# Patient Record
Sex: FEMALE | Race: WHITE | NOT HISPANIC OR LATINO | Employment: OTHER | URBAN - METROPOLITAN AREA
[De-identification: names, ages, dates, MRNs, and addresses within clinical notes are randomized per-mention and may not be internally consistent; named-entity substitution may affect disease eponyms.]

---

## 2021-02-01 ENCOUNTER — PREP FOR SURGERY (OUTPATIENT)
Dept: OTHER | Facility: HOSPITAL | Age: 81
End: 2021-02-01

## 2021-02-01 ENCOUNTER — OFFICE VISIT (OUTPATIENT)
Dept: NEUROSURGERY | Facility: CLINIC | Age: 81
End: 2021-02-01

## 2021-02-01 ENCOUNTER — TELEPHONE (OUTPATIENT)
Dept: NEUROSURGERY | Facility: CLINIC | Age: 81
End: 2021-02-01

## 2021-02-01 VITALS — BODY MASS INDEX: 28.16 KG/M2 | WEIGHT: 153 LBS | HEIGHT: 62 IN

## 2021-02-01 DIAGNOSIS — M25.559 PAIN IN JOINT INVOLVING PELVIC REGION AND THIGH, UNSPECIFIED LATERALITY: Primary | ICD-10-CM

## 2021-02-01 DIAGNOSIS — M54.50 LUMBAGO: Primary | ICD-10-CM

## 2021-02-01 PROBLEM — M43.16 SPONDYLOLISTHESIS AT L4-L5 LEVEL: Status: ACTIVE | Noted: 2021-02-01

## 2021-02-01 PROCEDURE — 99204 OFFICE O/P NEW MOD 45 MIN: CPT | Performed by: NEUROLOGICAL SURGERY

## 2021-02-01 RX ORDER — HYDROCODONE BITARTRATE AND ACETAMINOPHEN 7.5; 325 MG/1; MG/1
1 TABLET ORAL ONCE
Status: CANCELLED | OUTPATIENT
Start: 2021-02-01 | End: 2021-02-01

## 2021-02-01 RX ORDER — INSULIN GLARGINE 100 [IU]/ML
INJECTION, SOLUTION SUBCUTANEOUS DAILY
COMMUNITY
Start: 2020-10-25

## 2021-02-01 RX ORDER — DIPHENOXYLATE HYDROCHLORIDE AND ATROPINE SULFATE 2.5; .025 MG/1; MG/1
1 TABLET ORAL DAILY
COMMUNITY

## 2021-02-01 RX ORDER — ACETAMINOPHEN 325 MG/1
650 TABLET ORAL ONCE
Status: CANCELLED | OUTPATIENT
Start: 2021-02-01 | End: 2021-02-01

## 2021-02-01 RX ORDER — FAMOTIDINE 20 MG/1
20 TABLET, FILM COATED ORAL
Status: CANCELLED | OUTPATIENT
Start: 2021-02-01

## 2021-02-01 RX ORDER — HYDROCHLOROTHIAZIDE 12.5 MG/1
12.5 CAPSULE, GELATIN COATED ORAL DAILY
COMMUNITY

## 2021-02-01 RX ORDER — OMEPRAZOLE 20 MG/1
20 CAPSULE, DELAYED RELEASE ORAL DAILY
COMMUNITY
Start: 2020-12-14

## 2021-02-01 RX ORDER — TRAMADOL HYDROCHLORIDE 50 MG/1
TABLET ORAL
COMMUNITY

## 2021-02-01 RX ORDER — PROMETHAZINE HYDROCHLORIDE 25 MG/1
TABLET ORAL AS NEEDED
COMMUNITY
Start: 2021-01-29

## 2021-02-01 RX ORDER — IBUPROFEN 800 MG/1
800 TABLET ORAL ONCE
Status: CANCELLED | OUTPATIENT
Start: 2021-02-01 | End: 2021-02-01

## 2021-02-01 RX ORDER — FUROSEMIDE 20 MG/1
20 TABLET ORAL DAILY
COMMUNITY
Start: 2020-10-25

## 2021-02-01 RX ORDER — DIPHENHYDRAMINE HCL 25 MG
25 CAPSULE ORAL NIGHTLY PRN
COMMUNITY

## 2021-02-01 RX ORDER — CHLORHEXIDINE GLUCONATE 4 G/100ML
SOLUTION TOPICAL
Qty: 120 ML | Refills: 0 | Status: ON HOLD | OUTPATIENT
Start: 2021-02-01 | End: 2021-03-17

## 2021-02-01 RX ORDER — SODIUM CHLORIDE, SODIUM LACTATE, POTASSIUM CHLORIDE, CALCIUM CHLORIDE 600; 310; 30; 20 MG/100ML; MG/100ML; MG/100ML; MG/100ML
9 INJECTION, SOLUTION INTRAVENOUS CONTINUOUS
Status: CANCELLED | OUTPATIENT
Start: 2021-02-01

## 2021-02-01 RX ORDER — PREGABALIN 75 MG/1
75 CAPSULE ORAL ONCE
Status: CANCELLED | OUTPATIENT
Start: 2021-02-01 | End: 2021-02-01

## 2021-02-01 RX ORDER — LISINOPRIL 40 MG/1
40 TABLET ORAL 2 TIMES DAILY
COMMUNITY
Start: 2020-12-24

## 2021-02-01 RX ORDER — ALPRAZOLAM 0.5 MG/1
0.5 TABLET ORAL 2 TIMES DAILY PRN
COMMUNITY

## 2021-02-01 RX ORDER — CEFAZOLIN SODIUM 2 G/100ML
2 INJECTION, SOLUTION INTRAVENOUS ONCE
Status: CANCELLED | OUTPATIENT
Start: 2021-02-01 | End: 2021-02-01

## 2021-02-01 NOTE — TELEPHONE ENCOUNTER
Dr. Cordero's OV note is not complete - unclear if she is going to have surgery. If so, he may be willing to write something stronger such as Tramadol but that will need to be discussed with him. Please let her know we will get back to her tomorrow. We typically do not write for pain medication unless in post op period.

## 2021-02-01 NOTE — PROGRESS NOTES
NAME: FLAQUITO MARTINEZ   DOS: 2021  : 1940  PCP: Christian Mendoza MD    Chief Complaint:    Chief Complaint   Patient presents with   • Back Pain     Low back pain, that extends to lower extremeties.        History of Present Illness:  80 y.o. female       PMHX  Allergies:  Allergies   Allergen Reactions   • Amoxicillin-Pot Clavulanate Unknown - Low Severity   • Chlorzoxazone Unknown - High Severity   • Sulfa Antibiotics Unknown - High Severity     Medications    Current Outpatient Medications:   •  ALPRAZolam (XANAX) 0.5 MG tablet, alprazolam 0.5 mg tablet  Every night at bedtime, Disp: , Rfl:   •  Aspirin Buf,CaCarb-MgCarb-MgO, 81 MG tablet, aspirin 81 mg tablet  Daily, Disp: , Rfl:   •  diphenhydrAMINE (Benadryl Allergy) 25 mg capsule, Benadryl 25 mg capsule  prn, Disp: , Rfl:   •  furosemide (LASIX) 20 MG tablet, Daily., Disp: , Rfl:   •  hydroCHLOROthiazide (MICROZIDE) 12.5 MG capsule, Daily., Disp: , Rfl:   •  Lantus SoloStar 100 UNIT/ML injection pen, , Disp: , Rfl:   •  lisinopril (PRINIVIL,ZESTRIL) 40 MG tablet, 2 (two) times a day., Disp: , Rfl:   •  multivitamin (MULTI VITAMIN PO), Daily., Disp: , Rfl:   •  omeprazole (priLOSEC) 20 MG capsule, , Disp: , Rfl:   •  promethazine (PHENERGAN) 25 MG tablet, As Needed., Disp: , Rfl:   •  traMADol (ULTRAM) 50 MG tablet, tramadol 50 mg tablet  1 q Every twelve hours, Disp: , Rfl:   Past Medical History:  Past Medical History:   Diagnosis Date   • Arthritis    • Back problem    • Bladder infection    • Bleeding tendency (CMS/HCC)    • Bronchitis    • Diabetes mellitus (CMS/HCC)    • Direct hernia    • Gout    • Hearing loss    • Heart disease     stint placement   • Hepatitis    • History of stomach ulcers    • History of transfusion    • Hypertension      Past Surgical History:  Past Surgical History:   Procedure Laterality Date   • BREAST SURGERY     • CAROTID STENT     • DILATATION AND CURETTAGE     • OTHER SURGICAL HISTORY       Social  Hx:  Social History     Tobacco Use   • Smoking status: Never Smoker   • Smokeless tobacco: Never Used   Substance Use Topics   • Alcohol use: Never     Frequency: Never   • Drug use: Never     Family Hx:  Family History   Problem Relation Age of Onset   • Hypertension Mother    • Stroke Mother    • Heart disease Father    • Diabetes Father    • Stroke Father      Review of Systems:        Review of Systems   Constitutional: Positive for activity change and appetite change. Negative for chills, diaphoresis, fatigue, fever and unexpected weight change.   HENT: Positive for drooling. Negative for congestion, dental problem, ear discharge, ear pain, facial swelling, hearing loss, mouth sores, nosebleeds, postnasal drip, rhinorrhea, sinus pressure, sinus pain, sneezing, sore throat, tinnitus, trouble swallowing and voice change.    Eyes: Positive for visual disturbance. Negative for photophobia, pain, discharge, redness and itching.   Respiratory: Positive for shortness of breath. Negative for apnea, cough, choking, chest tightness, wheezing and stridor.    Cardiovascular: Positive for leg swelling. Negative for chest pain and palpitations.   Gastrointestinal: Negative for abdominal distention, abdominal pain, anal bleeding, blood in stool, constipation, diarrhea, nausea, rectal pain and vomiting.   Endocrine: Negative for cold intolerance, heat intolerance, polydipsia, polyphagia and polyuria.   Genitourinary: Positive for decreased urine volume and difficulty urinating. Negative for dyspareunia, dysuria, enuresis, flank pain, frequency, genital sores, hematuria, menstrual problem, pelvic pain, urgency, vaginal bleeding, vaginal discharge and vaginal pain.   Musculoskeletal: Positive for back pain, gait problem and joint swelling. Negative for arthralgias, myalgias, neck pain and neck stiffness.   Skin: Negative for color change, pallor, rash and wound.   Allergic/Immunologic: Negative for environmental allergies, food  allergies and immunocompromised state.   Neurological: Positive for numbness. Negative for dizziness, tremors, seizures, syncope, facial asymmetry, speech difficulty, weakness, light-headedness and headaches.   Hematological: Negative for adenopathy. Does not bruise/bleed easily.   Psychiatric/Behavioral: Positive for sleep disturbance. Negative for agitation, behavioral problems, confusion, decreased concentration, dysphoric mood, hallucinations, self-injury and suicidal ideas. The patient is nervous/anxious. The patient is not hyperactive.             Physical Examination:  There were no vitals filed for this visit.   General Appearance:   Well developed, well nourished, well groomed, alert, and cooperative.  Neurological examination:  Neurologic Exam      Review of Imaging/DATA:    Diagnoses/Plan:    Ms. Ogden is a 80 y.o. female ***

## 2021-02-01 NOTE — TELEPHONE ENCOUNTER
Provider:  Gil  Caller: Patient  Time of call:     Phone #:  [phone]  Surgery:  Lumbar Laminectomy, posterior lumbar fusion  Surgery Date: TBD    Last visit:  2/1/2021   Next visit: TBD        Reason for call:  Patient was seen in the office today by Dr. Cordero. She left a VM after her appointment, stating that she would like to know what she can take to help with the pain. Patient states that the tylenol and ibuprofen do not help with the pain.

## 2021-02-01 NOTE — PROGRESS NOTES
NAME: FLAQUITO MARTINEZ   DOS: 2021  : 1940  PCP: Christian Mendoza MD    Chief Complaint:    Chief Complaint   Patient presents with   • Back Pain     Low back pain, that extends to lower extremeties.        History of Present Illness:  80 y.o. female   I saw this very pleasant 80-year-old female in neurosurgical consultation she is a complex history of anxiety and depressive issues and recently has had chronic back pain issues for at least 2 years it precipitated with abrupt decline in function associated with use of a walker she has right-sided sciatic leg pain with a mild amount of foot weakness but she denies strictly any symptoms of cauda equina syndrome she is been to the emergency room twice.  She has been to the emergency room twice and has her daughter here with her she is intolerant to medications had a history of surgical complications from eye surgery and is very reticent to undergo any interventional therapy she has a chronic history of anxiety      PMHX  Allergies:  Allergies   Allergen Reactions   • Amoxicillin-Pot Clavulanate Unknown - Low Severity   • Chlorzoxazone Unknown - High Severity   • Sulfa Antibiotics Unknown - High Severity     Medications    Current Outpatient Medications:   •  ALPRAZolam (XANAX) 0.5 MG tablet, alprazolam 0.5 mg tablet  Every night at bedtime, Disp: , Rfl:   •  Aspirin Buf,CaCarb-MgCarb-MgO, 81 MG tablet, aspirin 81 mg tablet  Daily, Disp: , Rfl:   •  diphenhydrAMINE (Benadryl Allergy) 25 mg capsule, Benadryl 25 mg capsule  prn, Disp: , Rfl:   •  furosemide (LASIX) 20 MG tablet, Daily., Disp: , Rfl:   •  hydroCHLOROthiazide (MICROZIDE) 12.5 MG capsule, Daily., Disp: , Rfl:   •  Lantus SoloStar 100 UNIT/ML injection pen, , Disp: , Rfl:   •  lisinopril (PRINIVIL,ZESTRIL) 40 MG tablet, 2 (two) times a day., Disp: , Rfl:   •  multivitamin (MULTI VITAMIN PO), Daily., Disp: , Rfl:   •  omeprazole (priLOSEC) 20 MG capsule, , Disp: , Rfl:   •  promethazine  (PHENERGAN) 25 MG tablet, As Needed., Disp: , Rfl:   •  traMADol (ULTRAM) 50 MG tablet, tramadol 50 mg tablet  1 q Every twelve hours, Disp: , Rfl:   Past Medical History:  Past Medical History:   Diagnosis Date   • Arthritis    • Back problem    • Bladder infection    • Bleeding tendency (CMS/HCC)    • Bronchitis    • Diabetes mellitus (CMS/HCC)    • Direct hernia    • Gout    • Hearing loss    • Heart disease     stint placement   • Hepatitis    • History of stomach ulcers    • History of transfusion    • Hypertension      Past Surgical History:  Past Surgical History:   Procedure Laterality Date   • BREAST SURGERY     • CAROTID STENT     • DILATATION AND CURETTAGE  1967   • OTHER SURGICAL HISTORY       Social Hx:  Social History     Tobacco Use   • Smoking status: Never Smoker   • Smokeless tobacco: Never Used   Substance Use Topics   • Alcohol use: Never     Frequency: Never   • Drug use: Never     Family Hx:  Family History   Problem Relation Age of Onset   • Hypertension Mother    • Stroke Mother    • Heart disease Father    • Diabetes Father    • Stroke Father      Review of Systems:        Review of Systems negative for cauda equina syndrome fevers chills        Physical Examination:  Reviewed and in the chart  General Appearance:   Well developed, well nourished, well groomed, alert, and cooperative.  Neurological examination:  Neurologic Exam  Vital signs were reviewed and documented in the chart  Patient appeared in good neurologic function with normal comprehension fluent speech  Mood and affect are normal  Sense of smell deferred    Pupils symmetric equally reactive funduscopic exam not visualized   Visual fields intact to confrontation  Extraocular movements intact  Face motor function is symmetric  Facial sensations normal  Hearing intact to finger rub hearing intact to finger rub  Tongue is midline  Palate symmetric  Swallowing normal  Shoulder shrug normal    Muscle bulk and tone normal  5 out of 5  strength no motor drift, weakness right foot noted  Walks with a walker  Negative Romberg  No clonus long tract signs or myelopathy    Reflexes symmetric  No edema noted and extremities skin appears normal    Straight leg raise sign absent on the left positive on the right  No signs of intrinsic hip dysfunction  Back is without any lesions or abnormality  Feet are warm and well perfused    Review of Imaging/DATA:  MRI shows massive central disc herniation L4-5 with spondylolisthesis massive central disc herniation and critical central spinal canal stenosis.   Diagnoses/Plan:    Ms. Ogden is a 80 y.o. female   I  spent 45 minutes with her and her daughter I explained the complicated nature of the complex nature of her issue she is admitted advanced age very reticent to undergo any surgical procedures but has a massive central disc herniation with critical central spinal stenosis and I explained to her the nature of this she does not want surgery but I think she requires a I explained the risk benefits expected outcome from surgery I explained that I think that she has a very good prognosis but I did explain that there could be potential bumps in the road and she is understanding    I spent 45 minutes with her face-to-face     Addendum (2/10/2021): Insurance company is holding up patient surgery.  Patient surgery is scheduled for next week but is not willing to agree to a lumbar fusion when this is not absolutely necessary in this patient's case.  Patient has a massive central disc herniation with bilateral facet disease and a spondylolisthesis.  Patient has critical canal stenosis ( impending cauda equina syndrome) and this needs bilateral facetectomies for adequate decompression.  I told my staff that I am willing to do a personal peer to peer to get this approved for this poor lady.

## 2021-02-01 NOTE — PROGRESS NOTES
NAME: FLAQUITO MARTINEZ   DOS: 2021  : 1940  PCP: Christian Mendoza MD    Chief Complaint:    Spinal pain weakness    History of Present Illness:   I saw this very pleasant 80-year-old female in neurosurgical consultation she is a complex history of anxiety and depressive issues and recently has had chronic back pain issues for at least 2 years it precipitated with abrupt decline in function associated with use of a walker she has right-sided sciatic leg pain with a mild amount of foot weakness but she denies strictly any symptoms of cauda equina syndrome she is been to the emergency room twice      PMHX  Allergies:  Not on File  Medications  No current outpatient medications on file.  Past Medical History:  No past medical history on file.  Past Surgical History:  No past surgical history on file.  Social Hx:  Social History     Tobacco Use   • Smoking status: Not on file   Substance Use Topics   • Alcohol use: Not on file   • Drug use: Not on file     Family Hx:  No family history on file.  Review of Systems:        Review of Systems         Physical Examination:  There were no vitals filed for this visit.   General Appearance:   Well developed, well nourished, well groomed, alert, and cooperative.  Neurological examination:  Neurologic Exam      Review of Imaging/DATA:    Diagnoses/Plan:    Ms. Martinez is a 80 y.o. female ***

## 2021-02-02 NOTE — TELEPHONE ENCOUNTER
I called the patient to advise. Patient states that she is not taking the Norco and was just wondering if their was any other medication she can take. Then patient stated that it is okay if their isn't anything.     Patient had a question about the sx, that Cordero mentioned during her last visit, she would like to know if it will be invasive or laparoscopic?     Patient states that she is going to see her cardiologist for the clearance for the sx.

## 2021-02-02 NOTE — TELEPHONE ENCOUNTER
KIMBERLEE hernandez... she had pain pills yesterday. She should ask that provider to continue prescribing until surgery. She had norco yesterday.

## 2021-02-05 ENCOUNTER — TELEPHONE (OUTPATIENT)
Dept: NEUROSURGERY | Facility: CLINIC | Age: 81
End: 2021-02-05

## 2021-02-05 PROBLEM — M54.50 LUMBAGO: Status: ACTIVE | Noted: 2021-02-05

## 2021-02-05 NOTE — TELEPHONE ENCOUNTER
Caller: FLAQUITOJOHNY BERRIOSMARTINEZ    Relationship to patient: SELF    Best call back number: [phone]    Chief complaint: THE PATIENT NEEDS TO HAVE A STRESS TEST WITH HER CARDIOLOGIST BEFORE SCHEDULING SURGERY. THE EARLIEST THE STRESS TEST COULD BE SCHEDULED FOR WAS 2/15/21.     Type of visit: SURGERY    Requested date: ASAP    If rescheduling, when is the original appointment: N/A    Additional notes: THE PATIENT WAS CONCERNED IF SHE WILL BE ABLE TO HAVE HER SURGERY BEFORE DR. MORALES LEAVES FOR VACATION. SHE WILL BE CONTACTING HER CARDIOLOGIST ABOUT POSSIBLY SCHEDULING THE STRESS TEST SOONER.

## 2021-02-08 ENCOUNTER — TELEPHONE (OUTPATIENT)
Dept: NEUROSURGERY | Facility: CLINIC | Age: 81
End: 2021-02-08

## 2021-02-08 NOTE — TELEPHONE ENCOUNTER
Provider:  Gil  Caller: Linnea  Time of call:   11:15a  Phone #:  [phone]  Surgery:  LUMBAR LAMINECTOMY POSTERIOR LUMBAR INTERBODY FUSION 4/5  Surgery Date:  2/17/21  Last visit:   2/1/21  Next visit: --        Reason for call:         Patient is hesitant to proceed with stress test and the surgery because of the visitor policy. Doesn't like that no one can stay the night or come back and sit with her. She is requesting that an exception be made for her  or she doesn't think she can make it.     I advised her of the visitor hours and policies that are in place for all patient/staff safety.     Please advise.

## 2021-02-09 NOTE — TELEPHONE ENCOUNTER
Discussed with david... patients daughter may stay in room overnight. Patients  is at high risk... we haven't done this since the pandemic, so I am unsure of how to get this done... but policy for PRE op/ PAT and PACU will remain the same

## 2021-02-12 ENCOUNTER — TELEPHONE (OUTPATIENT)
Dept: NEUROSURGERY | Facility: CLINIC | Age: 81
End: 2021-02-12

## 2021-02-12 NOTE — TELEPHONE ENCOUNTER
Provider:  Gil  Caller: Patient  Time of call:     Phone #:    Surgery:    Surgery Date:    Last visit:     Next visit:     RENATE:         Reason for call: Patient states that she is having sx on Wednesday and she would like to know what will happen regarding the weather conditions. Patient states she understands that Dr. Cordero is leaving on the 22nd and she would like to get something worked out.

## 2021-02-12 NOTE — TELEPHONE ENCOUNTER
Called patient with an update.  I explained the risk of infection while being on steroids for an extended period prior to surgery.  She does not want to risk this, therefore we discussed how to wean prior to surgery.  She is going to consider this.     Additionally, patient informed me that she is concerned with her health issues that she would do well in surgery.  I explained to Dr. Cordero would not recommend intervention if he did not feel that it would not be of optimal benefit.  She is getting a stress test on Monday at that point she will be cardiac cleared.  Patient is very anxious to proceed.  She is going to consider things over the weekend.  I will call her Monday to discuss further.  She is aware that her surgery may be postponed due to impending weather, insurance and cardiac approval.

## 2021-02-12 NOTE — TELEPHONE ENCOUNTER
Spoke with patient. She is scheduled for stress test on Monday for cardiac clearance.  She is concerned with the weather.  I explained that additionally we do not know the status of the weather next week.  Per Dr. Cordero's documentation we are also awaiting insurance approval for her surgery.  I am going check the status of this today and let her know.  We may have to push her surgery out for insurance and weather reasons.    Additionally, patient informed me that she did not tell Dr. Cordero that she has been taking prednisone 10 mg for 3 months.  Plan to discuss this with him today.    I will call the patient back this afternoon with more information.

## 2021-02-15 ENCOUNTER — APPOINTMENT (OUTPATIENT)
Dept: PREADMISSION TESTING | Facility: HOSPITAL | Age: 81
End: 2021-02-15

## 2021-02-15 ENCOUNTER — TELEPHONE (OUTPATIENT)
Dept: NEUROSURGERY | Facility: CLINIC | Age: 81
End: 2021-02-15

## 2021-02-15 DIAGNOSIS — M43.16 SPONDYLOLISTHESIS AT L4-L5 LEVEL: Primary | ICD-10-CM

## 2021-02-15 NOTE — TELEPHONE ENCOUNTER
Spoke with the patient. Told her I'll reschedule her surgery once insurance approves it and cleared by cardiology.

## 2021-02-15 NOTE — TELEPHONE ENCOUNTER
Mrs. Roberson called stating that due to the weather for today and the rest of the week, she wouldn't be making it in for her stress test, blood work, and covid test for her surgery that is scheduled on 2/17. Also stated that because she doesn't know if her insurance would cover her surgery, she would like to reschedule at a later date.

## 2021-02-16 ENCOUNTER — TELEPHONE (OUTPATIENT)
Dept: PAIN MEDICINE | Facility: CLINIC | Age: 81
End: 2021-02-16

## 2021-02-16 ENCOUNTER — APPOINTMENT (OUTPATIENT)
Dept: PREADMISSION TESTING | Facility: HOSPITAL | Age: 81
End: 2021-02-16

## 2021-02-16 NOTE — TELEPHONE ENCOUNTER
CALLED PT AND SET UP APPT. 03/16 AT 1 AND TRIED TO WARM TRANSFER AS THE PT HAD SOME QUESTIONS, AND THE OFFICE WAS CLOSED 02/16. PT WANTS A CALL BACK REGARDING THE APPOINTMENT AND ALSO STATED THAT IF A APPOINTMENT BECAME AVAILABLE FOR THE TIMES 10 AND 3 ON A EARLIER DAY SHE WOULD LIKE A CALL.. OFFICE PLEASE ADVISE           BEST CALL BACK NUMBER:[phone]

## 2021-02-17 ENCOUNTER — TELEPHONE (OUTPATIENT)
Dept: NEUROSURGERY | Facility: CLINIC | Age: 81
End: 2021-02-17

## 2021-02-17 NOTE — TELEPHONE ENCOUNTER
Patient returned call with many questions. Patient stated her current situation, reporting that her insurance would not pay for her sx.     Patient would like to know if Dr. Cordero only does sx or if sx is the only option that is going to help her? I told the patient that I do not have the credentials to offer medical advice.     Does Dr. You work in our office? I told the patient yes.     Is there a pain management [place] that [place] to? I told the pt I was unsure.     I offered to give her the contact information to pain management. She states that she would like to talk to a PA.

## 2021-02-17 NOTE — TELEPHONE ENCOUNTER
Patient has a great deal of anxiety regarding her surgery. I have explained the urgency given her severe spinal stenosis and concern for progression to cauda equina syndrome. She is understanding.  We are still awaiting approval from insurance. She is agreeable to proceed if once approved.

## 2021-02-17 NOTE — TELEPHONE ENCOUNTER
Called patient to discuss all her questions and concerns. She is looking forward to trying our route instead of surgery.

## 2021-02-17 NOTE — TELEPHONE ENCOUNTER
Provider:  Gil  Caller: Patient  Time of call:   2:06  Phone #:  [phone]  Surgery:  NA  Surgery Date: NA   Last visit:   2/1/2021  Next visit: NA         Reason for call:  Patient left a VM stating that she had questions about her referral to pain management but she did not ask the questions in the VM.

## 2021-02-19 ENCOUNTER — TELEPHONE (OUTPATIENT)
Dept: NEUROSURGERY | Facility: CLINIC | Age: 81
End: 2021-02-19

## 2021-02-23 ENCOUNTER — TELEPHONE (OUTPATIENT)
Dept: NEUROSURGERY | Facility: CLINIC | Age: 81
End: 2021-02-23

## 2021-02-23 NOTE — TELEPHONE ENCOUNTER
Provider:  Gil  Caller: patient  Time of call:   9:24  Phone #:  [phone]  Surgery:  Upcoming Lumbar laminectomy  Surgery Date:  TBD  Last visit:   02/01/21  Next visit: 03/11/21    RENATE:     RADHA (reassurance)    Reason for call:    Patient called and wanted to know if there was anything she should or shouldn't do while she awaits cardiac clearance for her surgery?      She was feeling better yesterday after her PCP started her on Gabapentin 100 mg bid and she feels she may have over done it.  She did some cooking, cleaning and house hold chores, but experienced some really bad back pain last night.     After our conversation she stated all of her questions had been answered but assured me she would call back if she had any further questions.

## 2021-03-01 ENCOUNTER — TELEPHONE (OUTPATIENT)
Dept: NEUROSURGERY | Facility: CLINIC | Age: 81
End: 2021-03-01

## 2021-03-01 NOTE — TELEPHONE ENCOUNTER
Provider:  Ethan Cordero  Caller: Margoth Ogden  Time of call:   4:00  Phone #:  [phone]  Surgery:  na  Surgery Date: na   Last visit:02/01/2021     Next visit: 3/11/2021    RENATE:         Reason for call:     Pt called and said she was in terrible pain. She explained she had to cancel because of weather.   Pain started: about a month ago but has got worse.  Pain located in her back going down her leg and foot. It has got worse as it goes by.  The pain is like a stabbing or an electric shock the pain is all the time.  What makes it worse is if she bends or moves any at all. She is taking Gabapentin twice a day and Advil but it isn't helping.   She did not tell me how much the gabapentin was.   She wants to come in Thursday if she can to be seen since she will be in town for another appointment. If someone could call her that would be great.

## 2021-03-01 NOTE — TELEPHONE ENCOUNTER
Dr. Cordero and Nirmal are both booked full on Thursday. Can schedule with Barbra while Gil is in office.

## 2021-03-02 NOTE — TELEPHONE ENCOUNTER
Called and discussed with patient... she doesn't need apt on Thursday.     Please cancel visit.    She had numerous questions and all were answered.     She will have duglas send clearance ASAP

## 2021-03-02 NOTE — TELEPHONE ENCOUNTER
Patient's next OV: 3/4/2021- Barbra.     Patient called this AM regarding her COVID vaccine. She would like to know if the Covid vaccine will interfere with her a Surgery?    Patient would also like to know if she can start taking her Prednisone medication again?

## 2021-03-05 ENCOUNTER — TELEPHONE (OUTPATIENT)
Dept: NEUROSURGERY | Facility: CLINIC | Age: 81
End: 2021-03-05

## 2021-03-05 NOTE — TELEPHONE ENCOUNTER
Provider:  Gil  Caller: self  Time of call:   09:04  Phone #:  [phone]  Surgery:  none  Surgery Date:  none  Last visit:   02/01/2021  Next visit: 03/11/2021          Reason for call:     Patient left message stating she has been seen at [facility] for a stress test in order to be cleared for surgery.  She would like to know when she can be scheduled.

## 2021-03-07 NOTE — PROGRESS NOTES
Called and spoke with pt. For ~30 minutes.    Complaining of severe low back and bilateral lower extremity pain with walking/standing intolerances.     It appears that she just received her cardiac clearance prior to scheduling lumbar surgery.     She is wanting to present to [facility] ED for pain control.  In the absence of neurologic decline I recommended she resume her steroids and wait for Nirmal to call in the morning.    She is taking ibuprofen 600 mg TID and tylenol for breakthrough pain.   - she started gabapentin recently per her PCP    She does not tolerate narcotics.     She was thankful for the call and will await nirmal's call in the morning.

## 2021-03-08 NOTE — TELEPHONE ENCOUNTER
Patient called back and said she is still on steroids.  She wants to know if this has to be d/c prior to surgery, and if so, she wants to know what she can do about her pain in her leg and ankle swelling as this is the only thing that seems to help.

## 2021-03-09 ENCOUNTER — TELEPHONE (OUTPATIENT)
Dept: NEUROSURGERY | Facility: CLINIC | Age: 81
End: 2021-03-09

## 2021-03-09 NOTE — TELEPHONE ENCOUNTER
Provider:  Gil  Caller: patient  Time of call:   4:03  Phone #:  [phone]  Surgery:  Upcoming  Lumbar laminectomy  Surgery Date:  03-17-21  Last visit:   02/01/21  Next visit: surgery    RENATE:     02/01/2021 Alprazolam 0.5MG 1940 60 30 [facility] [address] [place] [address] 1  02/17/2021 Gabapentin 100MG 1940 30 15 [facility] [address] [place] [address] 1  03/06/2021 Alprazolam 0.5MG 1940 60 30 [facility] [address] [place] [address] 1  03/06/2021 Gabapentin 100MG 1940 60 30 [facility] [address] [place] [address] 1    Reason for call:     Patient called and said she is in the worse pain of her life!     She cannot sit, lay down and she said she can barely walk. She wants to know how she is suppose to deal with this pain.

## 2021-03-09 NOTE — TELEPHONE ENCOUNTER
Spoke with patient for around 25 minutes.  She continues to complain of pain that is severe and unrelenting.  She is also very concerned with swelling of her right ankle.  This has been worked up by her PCP with a negative LE Doppler.  Her pharmacist told her this may be related to her gabapentin and ibuprofen usage therefore she discontinued the gabapentin.  At this point she is unsure what to do for her pain.  She cannot tolerate narcotics.  She is taking high doses of Tylenol and ibuprofen.  No improvement with muscle relaxers.  This point she fears that her pain is so severe that she cannot travel for her surgery next week.  I explained to her that our options are quite limited at this time. She resumed prednisone 10 mg She also discussed concerns with her blood pressure and her pain.  She has inquired about going to her local ED.  I advised that she may do this if her blood pressure is very elevated secondary to pain.  I advised that it would be in her best interest to resume the gabapentin.  Unfortunately our options are quite limited at this time.  Will discuss with Nirmal Umana tomorrow.

## 2021-03-11 ENCOUNTER — TELEPHONE (OUTPATIENT)
Dept: NEUROSURGERY | Facility: CLINIC | Age: 81
End: 2021-03-11

## 2021-03-11 NOTE — TELEPHONE ENCOUNTER
She can continue her Advil right now if she needs this.  Recommend she contact her PCP regarding the medication side effects

## 2021-03-11 NOTE — TELEPHONE ENCOUNTER
I called Margoth back and notified her that she could take her Advil if she needed it. She had more questions and I told her that Nirmal would call her at some point.  She said thanks for calling

## 2021-03-11 NOTE — TELEPHONE ENCOUNTER
Patient has called back again and said she needs to her from someone so she can see her other doctor.

## 2021-03-11 NOTE — TELEPHONE ENCOUNTER
Provider:  Gil  Caller: patient  Time of call:   9:58  Phone #:  [phone]  Surgery:  Lumbar laminectomy  Surgery Date:  03-17-21  Last visit:   02/01/21  Next visit: surgery    RENATE:         Reason for call:     Patient called and said she saw her PCP yesterday and had some blood work.  The nurse told her to d/c Advil immediately as it would increase her bleeding at surgery.  She also d/c her steroids.  She wants to know if this is true?    They have her on Tramadol 50 mg qid, and she took 1/2 Xanax at 9 pm and the other half at 12-1 am.  She said he mind was racing and she was having some strange thoughts. Patient states she was up all night and she is wondering if she had a reaction to her meds?

## 2021-03-16 ENCOUNTER — APPOINTMENT (OUTPATIENT)
Dept: PREADMISSION TESTING | Facility: HOSPITAL | Age: 81
End: 2021-03-16

## 2021-03-16 ENCOUNTER — ANESTHESIA EVENT (OUTPATIENT)
Dept: PERIOP | Facility: HOSPITAL | Age: 81
End: 2021-03-16

## 2021-03-16 VITALS — BODY MASS INDEX: 28.52 KG/M2 | HEIGHT: 62 IN | WEIGHT: 155 LBS

## 2021-03-16 DIAGNOSIS — M54.50 LUMBAGO: ICD-10-CM

## 2021-03-16 LAB
ANION GAP SERPL CALCULATED.3IONS-SCNC: 13 MMOL/L (ref 5–15)
BUN SERPL-MCNC: 35 MG/DL (ref 8–23)
BUN/CREAT SERPL: 27.3 (ref 7–25)
CALCIUM SPEC-SCNC: 9.7 MG/DL (ref 8.6–10.5)
CHLORIDE SERPL-SCNC: 86 MMOL/L (ref 98–107)
CO2 SERPL-SCNC: 26 MMOL/L (ref 22–29)
CREAT SERPL-MCNC: 1.28 MG/DL (ref 0.57–1)
DEPRECATED RDW RBC AUTO: 42 FL (ref 37–54)
ERYTHROCYTE [DISTWIDTH] IN BLOOD BY AUTOMATED COUNT: 12.5 % (ref 12.3–15.4)
GFR SERPL CREATININE-BSD FRML MDRD: 40 ML/MIN/1.73
GLUCOSE SERPL-MCNC: 185 MG/DL (ref 65–99)
HBA1C MFR BLD: 7.8 % (ref 4.8–5.6)
HCT VFR BLD AUTO: 35.4 % (ref 34–46.6)
HGB BLD-MCNC: 11.9 G/DL (ref 12–15.9)
MCH RBC QN AUTO: 31.2 PG (ref 26.6–33)
MCHC RBC AUTO-ENTMCNC: 33.6 G/DL (ref 31.5–35.7)
MCV RBC AUTO: 92.7 FL (ref 79–97)
MRSA DNA SPEC QL NAA+PROBE: NEGATIVE
PLATELET # BLD AUTO: 270 10*3/MM3 (ref 140–450)
PMV BLD AUTO: 9.9 FL (ref 6–12)
POTASSIUM SERPL-SCNC: 4.7 MMOL/L (ref 3.5–5.2)
RBC # BLD AUTO: 3.82 10*6/MM3 (ref 3.77–5.28)
SARS-COV-2 RNA RESP QL NAA+PROBE: NOT DETECTED
SODIUM SERPL-SCNC: 125 MMOL/L (ref 136–145)
WBC # BLD AUTO: 8.23 10*3/MM3 (ref 3.4–10.8)

## 2021-03-16 PROCEDURE — C9803 HOPD COVID-19 SPEC COLLECT: HCPCS

## 2021-03-16 PROCEDURE — 80048 BASIC METABOLIC PNL TOTAL CA: CPT

## 2021-03-16 PROCEDURE — 83036 HEMOGLOBIN GLYCOSYLATED A1C: CPT

## 2021-03-16 PROCEDURE — 36415 COLL VENOUS BLD VENIPUNCTURE: CPT

## 2021-03-16 PROCEDURE — 87641 MR-STAPH DNA AMP PROBE: CPT

## 2021-03-16 PROCEDURE — U0003 INFECTIOUS AGENT DETECTION BY NUCLEIC ACID (DNA OR RNA); SEVERE ACUTE RESPIRATORY SYNDROME CORONAVIRUS 2 (SARS-COV-2) (CORONAVIRUS DISEASE [COVID-19]), AMPLIFIED PROBE TECHNIQUE, MAKING USE OF HIGH THROUGHPUT TECHNOLOGIES AS DESCRIBED BY CMS-2020-01-R: HCPCS

## 2021-03-16 PROCEDURE — 85027 COMPLETE CBC AUTOMATED: CPT

## 2021-03-16 RX ORDER — FAMOTIDINE 20 MG/1
20 TABLET, FILM COATED ORAL ONCE
Status: CANCELLED | OUTPATIENT
Start: 2021-03-16 | End: 2021-03-16

## 2021-03-16 RX ORDER — FAMOTIDINE 10 MG/ML
20 INJECTION, SOLUTION INTRAVENOUS ONCE
Status: CANCELLED | OUTPATIENT
Start: 2021-03-16 | End: 2021-03-16

## 2021-03-16 RX ORDER — GABAPENTIN 100 MG/1
100 CAPSULE ORAL 2 TIMES DAILY
COMMUNITY
End: 2021-03-18 | Stop reason: HOSPADM

## 2021-03-16 NOTE — PAT
Patient to apply Chlorhexadine wipes  to surgical area (as instructed) the night before procedure and the AM of procedure. Wipes provided.    Patient instructed to drink 20 ounces (or until full) of Gatorade and it needs to be completed 1 hour before given arrival time for procedure (NO RED Gatorade)    Patient verbalized understanding.    Rapid Covid done in [facility] due to surgery tomorrow.     Per Anesthesia Request, patient instructed not to take their ACE/ARB medications on the AM of surgery.    EKG from 2-4-2021  Cardiac clearance on chart.     Right ankle is more swollen than the left, notified DAMIEN Maldonado.  He states that Dr. Cordero is aware and pt had an ultrasound of leg to rule out DVT already.     Visitor policy reinforced with pt.  Told her that no visitors could spend the night.

## 2021-03-17 ENCOUNTER — APPOINTMENT (OUTPATIENT)
Dept: GENERAL RADIOLOGY | Facility: HOSPITAL | Age: 81
End: 2021-03-17

## 2021-03-17 ENCOUNTER — HOSPITAL ENCOUNTER (INPATIENT)
Facility: HOSPITAL | Age: 81
LOS: 1 days | Discharge: HOME | End: 2021-03-18
Attending: NEUROLOGICAL SURGERY | Admitting: NEUROLOGICAL SURGERY

## 2021-03-17 ENCOUNTER — ANESTHESIA (OUTPATIENT)
Dept: PERIOP | Facility: HOSPITAL | Age: 81
End: 2021-03-17

## 2021-03-17 DIAGNOSIS — M43.16 SPONDYLOLISTHESIS AT L4-L5 LEVEL: Primary | ICD-10-CM

## 2021-03-17 DIAGNOSIS — M54.50 LUMBAGO: ICD-10-CM

## 2021-03-17 LAB
GLUCOSE BLDC GLUCOMTR-MCNC: 171 MG/DL (ref 70–130)
SODIUM SERPL-SCNC: 122 MMOL/L (ref 136–145)

## 2021-03-17 PROCEDURE — 0SG00AJ FUSION OF LUMBAR VERTEBRAL JOINT WITH INTERBODY FUSION DEVICE, POSTERIOR APPROACH, ANTERIOR COLUMN, OPEN APPROACH: ICD-10-PCS | Performed by: NEUROLOGICAL SURGERY

## 2021-03-17 PROCEDURE — 22840 INSERT SPINE FIXATION DEVICE: CPT | Performed by: NEUROLOGICAL SURGERY

## 2021-03-17 PROCEDURE — C1713 ANCHOR/SCREW BN/BN,TIS/BN: HCPCS | Performed by: NEUROLOGICAL SURGERY

## 2021-03-17 PROCEDURE — 22853 INSJ BIOMECHANICAL DEVICE: CPT | Performed by: NEUROLOGICAL SURGERY

## 2021-03-17 PROCEDURE — 22840 INSERT SPINE FIXATION DEVICE: CPT | Performed by: PHYSICIAN ASSISTANT

## 2021-03-17 PROCEDURE — 25010000003 CEFAZOLIN IN DEXTROSE 2-4 GM/100ML-% SOLUTION: Performed by: NEUROLOGICAL SURGERY

## 2021-03-17 PROCEDURE — 76000 FLUOROSCOPY <1 HR PHYS/QHP: CPT

## 2021-03-17 PROCEDURE — 25010000002 MIDAZOLAM PER 1 MG: Performed by: ANESTHESIOLOGY

## 2021-03-17 PROCEDURE — 82962 GLUCOSE BLOOD TEST: CPT

## 2021-03-17 PROCEDURE — 25010000002 DEXAMETHASONE PER 1 MG: Performed by: NURSE ANESTHETIST, CERTIFIED REGISTERED

## 2021-03-17 PROCEDURE — 22853 INSJ BIOMECHANICAL DEVICE: CPT | Performed by: PHYSICIAN ASSISTANT

## 2021-03-17 PROCEDURE — 25010000002 PROPOFOL 10 MG/ML EMULSION: Performed by: NURSE ANESTHETIST, CERTIFIED REGISTERED

## 2021-03-17 PROCEDURE — 84295 ASSAY OF SERUM SODIUM: CPT | Performed by: ANESTHESIOLOGY

## 2021-03-17 PROCEDURE — 25010000002 NEOSTIGMINE 10 MG/10ML SOLUTION: Performed by: NURSE ANESTHETIST, CERTIFIED REGISTERED

## 2021-03-17 PROCEDURE — 22630 ARTHRD PST TQ 1NTRSPC LUM: CPT | Performed by: NEUROLOGICAL SURGERY

## 2021-03-17 PROCEDURE — 22630 ARTHRD PST TQ 1NTRSPC LUM: CPT | Performed by: PHYSICIAN ASSISTANT

## 2021-03-17 PROCEDURE — 25010000002 DEXAMETHASONE SODIUM PHOSPHATE 10 MG/ML SOLUTION 1 ML VIAL: Performed by: NEUROLOGICAL SURGERY

## 2021-03-17 PROCEDURE — 25010000002 FENTANYL CITRATE (PF) 100 MCG/2ML SOLUTION: Performed by: NURSE ANESTHETIST, CERTIFIED REGISTERED

## 2021-03-17 PROCEDURE — 0SB20ZZ EXCISION OF LUMBAR VERTEBRAL DISC, OPEN APPROACH: ICD-10-PCS | Performed by: NEUROLOGICAL SURGERY

## 2021-03-17 PROCEDURE — 25010000002 ONDANSETRON PER 1 MG: Performed by: NURSE ANESTHETIST, CERTIFIED REGISTERED

## 2021-03-17 PROCEDURE — 25010000002 PHENYLEPHRINE 10 MG/ML SOLUTION 1 ML VIAL: Performed by: NURSE ANESTHETIST, CERTIFIED REGISTERED

## 2021-03-17 DEVICE — SCREW 54840016540 CN MAS 6.5X40 CC
Type: IMPLANTABLE DEVICE | Site: SPINE LUMBAR | Status: FUNCTIONAL
Brand: CD HORIZON® SPINAL SYSTEM

## 2021-03-17 DEVICE — DBM T42200 2.5CMX10CM 2 EACH GRAFTON MAT
Type: IMPLANTABLE DEVICE | Site: SPINE LUMBAR | Status: FUNCTIONAL
Brand: GRAFTON®AND GRAFTON PLUS®DEMINERALIZED BONE MATRIX (DBM)

## 2021-03-17 DEVICE — CAGE 2660924 WAVE D 9MM X 24MM 6DEG
Type: IMPLANTABLE DEVICE | Site: SPINE LUMBAR | Status: FUNCTIONAL
Brand: WAVE D SPINAL SYSTEM

## 2021-03-17 DEVICE — FLOSEAL HEMOSTATIC MATRIX, 10ML
Type: IMPLANTABLE DEVICE | Site: SPINE LUMBAR | Status: FUNCTIONAL
Brand: FLOSEAL HEMOSTATIC MATRIX

## 2021-03-17 RX ORDER — ACETAMINOPHEN 325 MG/1
650 TABLET ORAL ONCE
Status: COMPLETED | OUTPATIENT
Start: 2021-03-17 | End: 2021-03-17

## 2021-03-17 RX ORDER — MIDAZOLAM HYDROCHLORIDE 1 MG/ML
1 INJECTION INTRAMUSCULAR; INTRAVENOUS
Status: COMPLETED | OUTPATIENT
Start: 2021-03-17 | End: 2021-03-17

## 2021-03-17 RX ORDER — SODIUM CHLORIDE, SODIUM LACTATE, POTASSIUM CHLORIDE, CALCIUM CHLORIDE 600; 310; 30; 20 MG/100ML; MG/100ML; MG/100ML; MG/100ML
9 INJECTION, SOLUTION INTRAVENOUS CONTINUOUS
Status: DISCONTINUED | OUTPATIENT
Start: 2021-03-17 | End: 2021-03-18 | Stop reason: HOSPADM

## 2021-03-17 RX ORDER — SODIUM CHLORIDE 0.9 % (FLUSH) 0.9 %
10 SYRINGE (ML) INJECTION AS NEEDED
Status: DISCONTINUED | OUTPATIENT
Start: 2021-03-17 | End: 2021-03-17 | Stop reason: HOSPADM

## 2021-03-17 RX ORDER — LIDOCAINE HYDROCHLORIDE 10 MG/ML
0.5 INJECTION, SOLUTION EPIDURAL; INFILTRATION; INTRACAUDAL; PERINEURAL ONCE AS NEEDED
Status: COMPLETED | OUTPATIENT
Start: 2021-03-17 | End: 2021-03-17

## 2021-03-17 RX ORDER — DEXAMETHASONE SODIUM PHOSPHATE 4 MG/ML
INJECTION, SOLUTION INTRA-ARTICULAR; INTRALESIONAL; INTRAMUSCULAR; INTRAVENOUS; SOFT TISSUE AS NEEDED
Status: DISCONTINUED | OUTPATIENT
Start: 2021-03-17 | End: 2021-03-17 | Stop reason: SURG

## 2021-03-17 RX ORDER — MIDAZOLAM HYDROCHLORIDE 1 MG/ML
2 INJECTION INTRAMUSCULAR; INTRAVENOUS ONCE
Status: DISCONTINUED | OUTPATIENT
Start: 2021-03-17 | End: 2021-03-17 | Stop reason: HOSPADM

## 2021-03-17 RX ORDER — ONDANSETRON 2 MG/ML
4 INJECTION INTRAMUSCULAR; INTRAVENOUS EVERY 6 HOURS PRN
Status: DISCONTINUED | OUTPATIENT
Start: 2021-03-17 | End: 2021-03-18 | Stop reason: HOSPADM

## 2021-03-17 RX ORDER — OXYCODONE HYDROCHLORIDE AND ACETAMINOPHEN 5; 325 MG/1; MG/1
1 TABLET ORAL EVERY 4 HOURS PRN
Status: DISCONTINUED | OUTPATIENT
Start: 2021-03-17 | End: 2021-03-18 | Stop reason: HOSPADM

## 2021-03-17 RX ORDER — SODIUM CHLORIDE 0.9 % (FLUSH) 0.9 %
3 SYRINGE (ML) INJECTION EVERY 12 HOURS SCHEDULED
Status: DISCONTINUED | OUTPATIENT
Start: 2021-03-17 | End: 2021-03-18 | Stop reason: HOSPADM

## 2021-03-17 RX ORDER — FAMOTIDINE 20 MG/1
20 TABLET, FILM COATED ORAL
Status: COMPLETED | OUTPATIENT
Start: 2021-03-17 | End: 2021-03-17

## 2021-03-17 RX ORDER — PROPOFOL 10 MG/ML
VIAL (ML) INTRAVENOUS AS NEEDED
Status: DISCONTINUED | OUTPATIENT
Start: 2021-03-17 | End: 2021-03-17 | Stop reason: SURG

## 2021-03-17 RX ORDER — HYDROCODONE BITARTRATE AND ACETAMINOPHEN 7.5; 325 MG/1; MG/1
1 TABLET ORAL ONCE
Status: DISCONTINUED | OUTPATIENT
Start: 2021-03-17 | End: 2021-03-17 | Stop reason: HOSPADM

## 2021-03-17 RX ORDER — SODIUM CHLORIDE 9 MG/ML
100 INJECTION, SOLUTION INTRAVENOUS CONTINUOUS
Status: DISCONTINUED | OUTPATIENT
Start: 2021-03-17 | End: 2021-03-18 | Stop reason: HOSPADM

## 2021-03-17 RX ORDER — PREGABALIN 75 MG/1
75 CAPSULE ORAL ONCE
Status: COMPLETED | OUTPATIENT
Start: 2021-03-17 | End: 2021-03-17

## 2021-03-17 RX ORDER — MAGNESIUM HYDROXIDE 1200 MG/15ML
LIQUID ORAL AS NEEDED
Status: DISCONTINUED | OUTPATIENT
Start: 2021-03-17 | End: 2021-03-17 | Stop reason: HOSPADM

## 2021-03-17 RX ORDER — POLYETHYLENE GLYCOL 3350 17 G/17G
17 POWDER, FOR SOLUTION ORAL DAILY
Status: DISCONTINUED | OUTPATIENT
Start: 2021-03-18 | End: 2021-03-18 | Stop reason: HOSPADM

## 2021-03-17 RX ORDER — LIDOCAINE HYDROCHLORIDE AND EPINEPHRINE 5; 5 MG/ML; UG/ML
INJECTION, SOLUTION INFILTRATION; PERINEURAL AS NEEDED
Status: DISCONTINUED | OUTPATIENT
Start: 2021-03-17 | End: 2021-03-17 | Stop reason: HOSPADM

## 2021-03-17 RX ORDER — SODIUM CHLORIDE 0.9 % (FLUSH) 0.9 %
10 SYRINGE (ML) INJECTION AS NEEDED
Status: DISCONTINUED | OUTPATIENT
Start: 2021-03-17 | End: 2021-03-18 | Stop reason: HOSPADM

## 2021-03-17 RX ORDER — NEOSTIGMINE METHYLSULFATE 1 MG/ML
INJECTION, SOLUTION INTRAVENOUS AS NEEDED
Status: DISCONTINUED | OUTPATIENT
Start: 2021-03-17 | End: 2021-03-17 | Stop reason: SURG

## 2021-03-17 RX ORDER — CEFAZOLIN SODIUM 2 G/100ML
2 INJECTION, SOLUTION INTRAVENOUS ONCE
Status: COMPLETED | OUTPATIENT
Start: 2021-03-17 | End: 2021-03-17

## 2021-03-17 RX ORDER — MINERAL OIL
OIL (ML) MISCELLANEOUS AS NEEDED
Status: DISCONTINUED | OUTPATIENT
Start: 2021-03-17 | End: 2021-03-17 | Stop reason: HOSPADM

## 2021-03-17 RX ORDER — DIPHENOXYLATE HYDROCHLORIDE AND ATROPINE SULFATE 2.5; .025 MG/1; MG/1
1 TABLET ORAL DAILY
Status: DISCONTINUED | OUTPATIENT
Start: 2021-03-18 | End: 2021-03-18 | Stop reason: HOSPADM

## 2021-03-17 RX ORDER — ROCURONIUM BROMIDE 10 MG/ML
INJECTION, SOLUTION INTRAVENOUS AS NEEDED
Status: DISCONTINUED | OUTPATIENT
Start: 2021-03-17 | End: 2021-03-17 | Stop reason: SURG

## 2021-03-17 RX ORDER — CEFAZOLIN SODIUM 2 G/100ML
2 INJECTION, SOLUTION INTRAVENOUS EVERY 8 HOURS
Status: COMPLETED | OUTPATIENT
Start: 2021-03-17 | End: 2021-03-18

## 2021-03-17 RX ORDER — MIDAZOLAM HYDROCHLORIDE 1 MG/ML
0.5 INJECTION INTRAMUSCULAR; INTRAVENOUS
Status: COMPLETED | OUTPATIENT
Start: 2021-03-17 | End: 2021-03-17

## 2021-03-17 RX ORDER — EPHEDRINE SULFATE 50 MG/ML
INJECTION, SOLUTION INTRAVENOUS AS NEEDED
Status: DISCONTINUED | OUTPATIENT
Start: 2021-03-17 | End: 2021-03-17 | Stop reason: SURG

## 2021-03-17 RX ORDER — BUPIVACAINE HCL/0.9 % NACL/PF 0.125 %
PLASTIC BAG, INJECTION (ML) EPIDURAL AS NEEDED
Status: DISCONTINUED | OUTPATIENT
Start: 2021-03-17 | End: 2021-03-17 | Stop reason: SURG

## 2021-03-17 RX ORDER — DIAZEPAM 2 MG/1
2 TABLET ORAL EVERY 8 HOURS PRN
Status: DISCONTINUED | OUTPATIENT
Start: 2021-03-17 | End: 2021-03-18 | Stop reason: HOSPADM

## 2021-03-17 RX ORDER — SODIUM CHLORIDE 0.9 % (FLUSH) 0.9 %
10 SYRINGE (ML) INJECTION EVERY 12 HOURS SCHEDULED
Status: DISCONTINUED | OUTPATIENT
Start: 2021-03-17 | End: 2021-03-17 | Stop reason: HOSPADM

## 2021-03-17 RX ORDER — FENTANYL CITRATE 50 UG/ML
50 INJECTION, SOLUTION INTRAMUSCULAR; INTRAVENOUS
Status: DISCONTINUED | OUTPATIENT
Start: 2021-03-17 | End: 2021-03-17

## 2021-03-17 RX ORDER — ONDANSETRON 2 MG/ML
INJECTION INTRAMUSCULAR; INTRAVENOUS AS NEEDED
Status: DISCONTINUED | OUTPATIENT
Start: 2021-03-17 | End: 2021-03-17 | Stop reason: SURG

## 2021-03-17 RX ORDER — GLYCOPYRROLATE 0.2 MG/ML
INJECTION INTRAMUSCULAR; INTRAVENOUS AS NEEDED
Status: DISCONTINUED | OUTPATIENT
Start: 2021-03-17 | End: 2021-03-17 | Stop reason: SURG

## 2021-03-17 RX ORDER — IBUPROFEN 800 MG/1
800 TABLET ORAL ONCE
Status: COMPLETED | OUTPATIENT
Start: 2021-03-17 | End: 2021-03-17

## 2021-03-17 RX ORDER — ALPRAZOLAM 0.25 MG/1
0.25 TABLET ORAL 4 TIMES DAILY PRN
Status: DISCONTINUED | OUTPATIENT
Start: 2021-03-17 | End: 2021-03-18 | Stop reason: HOSPADM

## 2021-03-17 RX ORDER — PANTOPRAZOLE SODIUM 40 MG/1
40 TABLET, DELAYED RELEASE ORAL EVERY MORNING
Status: DISCONTINUED | OUTPATIENT
Start: 2021-03-18 | End: 2021-03-18 | Stop reason: HOSPADM

## 2021-03-17 RX ORDER — ONDANSETRON 2 MG/ML
4 INJECTION INTRAMUSCULAR; INTRAVENOUS ONCE AS NEEDED
Status: DISCONTINUED | OUTPATIENT
Start: 2021-03-17 | End: 2021-03-17

## 2021-03-17 RX ADMIN — FENTANYL CITRATE 50 MCG: 50 INJECTION, SOLUTION INTRAMUSCULAR; INTRAVENOUS at 16:50

## 2021-03-17 RX ADMIN — LIDOCAINE HYDROCHLORIDE 0.5 ML: 10 INJECTION, SOLUTION EPIDURAL; INFILTRATION; INTRACAUDAL; PERINEURAL at 12:20

## 2021-03-17 RX ADMIN — SODIUM CHLORIDE 100 ML/HR: 9 INJECTION, SOLUTION INTRAVENOUS at 18:47

## 2021-03-17 RX ADMIN — EPHEDRINE SULFATE 20 MG: 50 INJECTION, SOLUTION INTRAVENOUS at 14:05

## 2021-03-17 RX ADMIN — PREGABALIN 75 MG: 75 CAPSULE ORAL at 12:20

## 2021-03-17 RX ADMIN — MIDAZOLAM HYDROCHLORIDE 1 MG: 1 INJECTION, SOLUTION INTRAMUSCULAR; INTRAVENOUS at 12:58

## 2021-03-17 RX ADMIN — Medication 80 MCG: at 14:42

## 2021-03-17 RX ADMIN — IBUPROFEN 800 MG: 800 TABLET, FILM COATED ORAL at 12:20

## 2021-03-17 RX ADMIN — GLYCOPYRROLATE 0.4 MG: 0.4 INJECTION INTRAMUSCULAR; INTRAVENOUS at 16:04

## 2021-03-17 RX ADMIN — FAMOTIDINE 20 MG: 20 TABLET ORAL at 12:20

## 2021-03-17 RX ADMIN — ROCURONIUM BROMIDE 50 MG: 10 INJECTION INTRAVENOUS at 13:42

## 2021-03-17 RX ADMIN — ONDANSETRON 4 MG: 2 INJECTION INTRAMUSCULAR; INTRAVENOUS at 13:57

## 2021-03-17 RX ADMIN — OXYCODONE AND ACETAMINOPHEN 1 TABLET: 5; 325 TABLET ORAL at 21:35

## 2021-03-17 RX ADMIN — ROCURONIUM BROMIDE 10 MG: 10 INJECTION INTRAVENOUS at 15:25

## 2021-03-17 RX ADMIN — DEXAMETHASONE SODIUM PHOSPHATE 8 MG: 4 INJECTION, SOLUTION INTRA-ARTICULAR; INTRALESIONAL; INTRAMUSCULAR; INTRAVENOUS; SOFT TISSUE at 13:57

## 2021-03-17 RX ADMIN — SODIUM CHLORIDE, POTASSIUM CHLORIDE, SODIUM LACTATE AND CALCIUM CHLORIDE: 600; 310; 30; 20 INJECTION, SOLUTION INTRAVENOUS at 13:38

## 2021-03-17 RX ADMIN — PROPOFOL 150 MG: 10 INJECTION, EMULSION INTRAVENOUS at 13:42

## 2021-03-17 RX ADMIN — ALPRAZOLAM 0.25 MG: 0.25 TABLET ORAL at 23:37

## 2021-03-17 RX ADMIN — MIDAZOLAM HYDROCHLORIDE 1 MG: 1 INJECTION, SOLUTION INTRAMUSCULAR; INTRAVENOUS at 12:41

## 2021-03-17 RX ADMIN — PHENYLEPHRINE HYDROCHLORIDE 0.3 MCG/KG/MIN: 10 INJECTION INTRAVENOUS at 14:44

## 2021-03-17 RX ADMIN — FENTANYL CITRATE 50 MCG: 50 INJECTION, SOLUTION INTRAMUSCULAR; INTRAVENOUS at 17:20

## 2021-03-17 RX ADMIN — Medication 80 MCG: at 14:26

## 2021-03-17 RX ADMIN — ACETAMINOPHEN 650 MG: 325 TABLET ORAL at 12:20

## 2021-03-17 RX ADMIN — NEOSTIGMINE 3 MG: 1 INJECTION INTRAVENOUS at 16:04

## 2021-03-17 RX ADMIN — CEFAZOLIN SODIUM 2 G: 2 INJECTION, SOLUTION INTRAVENOUS at 13:40

## 2021-03-17 RX ADMIN — CEFAZOLIN SODIUM 2 G: 2 INJECTION, SOLUTION INTRAVENOUS at 21:30

## 2021-03-17 RX ADMIN — SODIUM CHLORIDE, POTASSIUM CHLORIDE, SODIUM LACTATE AND CALCIUM CHLORIDE 9 ML/HR: 600; 310; 30; 20 INJECTION, SOLUTION INTRAVENOUS at 12:20

## 2021-03-17 RX ADMIN — SODIUM CHLORIDE, POTASSIUM CHLORIDE, SODIUM LACTATE AND CALCIUM CHLORIDE: 600; 310; 30; 20 INJECTION, SOLUTION INTRAVENOUS at 14:44

## 2021-03-17 RX ADMIN — PROPOFOL 25 MCG/KG/MIN: 10 INJECTION, EMULSION INTRAVENOUS at 13:57

## 2021-03-17 NOTE — OP NOTE
Operation note Lumbar Fusion       Preoperative diagnosis left-sided large L4 5 lateral recess was stenosis and grade 1 spondylolisthesis, massive central disc herniation    Postoperative diagnosis same    Procedures performed   1.  L4 partial laminectomy bilateral lateral decompressions  2.  Bilateral transforaminal decompression L4 5  3.  Transforaminal lumbar interbody decompression and fusion with placement of  2 Medtronics expandable 9 mm expandable cage packed with autograft from the laminectomized bone, demineralized bone matrix    4.  Stealth neuro navigation and O arm assisted placement of L4-L5 pedicle screws 6.5 x 40, 6.5 x 35 mm respectively   5.  Autograft harvesting through the same incision    Surgeon: Ethan Cordero MD     Assistants: Nirmal Umana my physician's assistant was present and personally scrubbed the entirety of the procedure, they assisted suctioning, retraction, approach, and closure of the case    Anesthesia: Normal endotracheal anesthesia    ASA Class: 2  Blood loss 30 cc cc    Severe lateral recess compression, massive central disc herniation    Complications none        Procedure in detail after formal written consent was obtained the patient was taken to the operating room endotracheally intubated given preoperative antimicrobial prophylaxis they were prepped and draped in the usual sterile manner all bony prominences and genitalia were padded to prevent neurologic injury.    At this point in time the patient was given local anesthesia at the operative level fluoroscopic guidance confirmed the correct level and a small midline incision was made paraspinal musculature was dissected off the L4 lamina which allowed access to the L4 pedicle as well as L5 pedicles bilateral facetectomies were performed.      This allowed access to the transforaminal decompression area of the bilateral disc this was coagulated the disc space was entered into and multiple curettes were used to  fashion and prepped the endplate for cage placement.  Massive central disc herniation was removed a lot of this was calcific in nature as well.  A Luken's trap was used to harvest autograft    Placement at this point time of the Medtronic 9 expandable cages x2 were performed packed with autograft bone remainder of the autograft was impacted into the cage.  Graft on putty demineralized bone matrix was also placed, the spondylolisthesis was reduced well    Adequate decompression of the spinal nerve roots by using a ball probe was used to pass and all the respective foramina at the 4 and 5 levels to demonstrate that they were clear.  Fluoroscopic imaging confirmed adequate cage placement.  At this point time on the spinous process the navigation array was affixed 3-D rotational spent with the O arm was performed and subsequent placement using high-speed bur allowed placement of the L4 and L5 pedicle screws and a medial lateral trajectory through the decorticated pars.  5.5 mm taps were used followed by instrumentation of the screws.  6.5 screws were placed at the above levels.  At this point time the posterior rods were placed and torqued her appropriate tightness after compressing the bilaterally side of the cage    The areas were copiously irrigated, meticulous hemostasis was maintained and a small flat JAVIER drain was placed and tunneled through the skin skin was then closed in layers.    Fluoroscopic imaging again confirmed the correct level and appropriate instrumentation placement.    Findings of the surgery were discussed with the family

## 2021-03-17 NOTE — ANESTHESIA PROCEDURE NOTES
Airway  Urgency: elective    Date/Time: 3/17/2021 1:43 PM  Airway not difficult    General Information and Staff    Patient location during procedure: OR  CRNA: Aleta Mcdonald CRNA    Indications and Patient Condition  Indications for airway management: airway protection    Preoxygenated: yes  MILS not maintained throughout  Mask difficulty assessment: 1 - vent by mask    Final Airway Details  Final airway type: endotracheal airway      Successful airway: ETT  Cuffed: yes   Successful intubation technique: direct laryngoscopy  Facilitating devices/methods: intubating stylet  Endotracheal tube insertion site: oral  Blade: Blue  Blade size: 2  ETT size (mm): 7.0  Cormack-Lehane Classification: grade I - full view of glottis  Placement verified by: chest auscultation and capnometry   Measured from: lips  ETT/EBT  to lips (cm): 20  Number of attempts at approach: 1  Assessment: lips, teeth, and gum same as pre-op and atraumatic intubation    Additional Comments  Negative epigastric sounds, Breath sound equal bilaterally with symmetric chest rise and fall.  tEETH INTACT, ATRAUMATIC

## 2021-03-17 NOTE — PROGRESS NOTES
Patient doing well    Patient will be fitted for lumbar support orthosis to help with postop stability and pain

## 2021-03-17 NOTE — ANESTHESIA POSTPROCEDURE EVALUATION
Patient: Margoth Ogden    Procedure Summary     Date: 03/17/21 Room / Location:  [facility]  [facility]    Anesthesia Start: 1338 Anesthesia Stop: 1625    Procedure: LUMBAR LAMINECTOMY POSTERIOR LUMBAR INTERBODY FUSION L4-5 (N/A Spine Lumbar) Diagnosis:       Lumbago      (Lumbago [M54.5])    Surgeons: Ethan Cordero MD Provider: Cory Peña MD    Anesthesia Type: general ASA Status: 3          Anesthesia Type: general    Vitals  Vitals Value Taken Time   /47 03/17/21 1621   Temp     Pulse 70 03/17/21 1624   Resp     SpO2 96 % 03/17/21 1624   Vitals shown include unvalidated device data.        Post Anesthesia Care and Evaluation    Patient location during evaluation: PACU  Patient participation: complete - patient participated  Level of consciousness: sleepy but conscious  Pain management: adequate  Airway patency: patent  Anesthetic complications: No anesthetic complications  PONV Status: none  Cardiovascular status: hemodynamically stable and acceptable  Respiratory status: nonlabored ventilation, acceptable, nasal cannula and oral airway  Hydration status: acceptable

## 2021-03-17 NOTE — H&P
Pre-Op H&P  Margoth Ogden  [id number]  1940    Chief complaint: Low back pain that extends to lower extremities    HPI:    Patient is a 80 y.o.female who presents with a history of low back pain that extends to her lower extremities. MRI shows massive herniated disc at L4-L5 with spondylolisthesis and central spinal canal stenosis. Conservative treatment has failed to provide significant relief. Surgical intervention is recommended and she is agreeable. She is here today for a lumbar laminectomy, posterior lumbar interbody fusion L4-L5.    Review of Systems:  General ROS: negative for chills, fever or skin lesions;  No changes since last office visit.  Neg for recent sick exposure  Cardiovascular ROS: no chest pain or dyspnea on exertion; +HTN  Respiratory ROS: no cough, shortness of breath, or wheezing  Endocrine ROS: +IDDM  Hepatic ROS: remote history of Hep A    Allergies:   Allergies   Allergen Reactions   • Chlorzoxazone Swelling     Lips and facial swelling.    • Amoxicillin-Pot Clavulanate Unknown - Low Severity   • Sulfa Antibiotics Unknown - High Severity       Home Meds:    No current facility-administered medications on file prior to encounter.     Current Outpatient Medications on File Prior to Encounter   Medication Sig Dispense Refill   • ALPRAZolam (XANAX) 0.5 MG tablet Take 0.5 mg by mouth 2 (Two) Times a Day As Needed.     • diphenhydrAMINE (Benadryl Allergy) 25 mg capsule Take 25 mg by mouth At Night As Needed.     • furosemide (LASIX) 20 MG tablet Take 20 mg by mouth Daily.     • hydroCHLOROthiazide (MICROZIDE) 12.5 MG capsule Take 12.5 mg by mouth Daily.     • Lantus SoloStar 100 UNIT/ML injection pen Inject  under the skin into the appropriate area as directed Daily. Sliding scale     • lisinopril (PRINIVIL,ZESTRIL) 40 MG tablet Take 40 mg by mouth 2 (two) times a day.     • multivitamin (MULTI VITAMIN PO) 1 tablet Daily.     • omeprazole (priLOSEC) 20 MG capsule Take 20 mg by mouth Daily.  "    • promethazine (PHENERGAN) 25 MG tablet As Needed.     • traMADol (ULTRAM) 50 MG tablet tramadol 50 mg tablet   1 q Every twelve hours     • [DISCONTINUED] chlorhexidine (HIBICLENS) 4 % external liquid Shower each day with solution for 5 days beginning 5 days before surgery. 120 mL 0   • Aspirin Buf,CaCarb-MgCarb-MgO, 81 MG tablet Take 81 mg by mouth Daily.         PMH:   Past Medical History:   Diagnosis Date   • Arthritis    • Back problem    • Bladder infection    • Bleeding tendency (CMS/HCC)    • Bronchitis    • Diabetes mellitus (CMS/HCC)    • Direct hernia    • GERD (gastroesophageal reflux disease)    • Gout    • Hearing loss    • Heart disease     stint placement   • Hepatitis    • History of stomach ulcers    • History of transfusion     no reactions   • Hypertension      PSH:    Past Surgical History:   Procedure Laterality Date   • BREAST SURGERY     • CAROTID STENT  2005   • DILATATION AND CURETTAGE  1967   • EYE SURGERY Right     cataract   • OTHER SURGICAL HISTORY         Social History:   Tobacco:   Social History     Tobacco Use   Smoking Status Never Smoker   Smokeless Tobacco Never Used      Alcohol:     Social History     Substance and Sexual Activity   Alcohol Use Never       Vitals:           /69 (BP Location: Left arm, Patient Position: Sitting)   Pulse 87   Temp 97.4 °F (36.3 °C) (Temporal)   Resp 20   Ht 157.5 cm (62\")   Wt 70.3 kg (155 lb)   SpO2 99%   BMI 28.35 kg/m²     Physical Exam:  General Appearance:    Alert, cooperative, no distress, appears stated age   Head:    Normocephalic, without obvious abnormality, atraumatic   Lungs:     Clear to auscultation bilaterally, respirations unlabored    Heart:   Regular rate and rhythm, S1 and S2 normal, no murmur, rub    or gallop    Abdomen:    Soft, non-tender, +bowel sounds   Breast Exam:    deferred   Genitalia:    deferred   Extremities:   Extremities normal, atraumatic, no cyanosis or edema   Skin:   Skin color, texture, " turgor normal, no rashes or lesions   Neurologic:   Grossly intact   Results Review  LABS:  Lab Results   Component Value Date    WBC 8.23 03/16/2021    HGB 11.9 (L) 03/16/2021    HCT 35.4 03/16/2021    MCV 92.7 03/16/2021     03/16/2021    GLUCOSE 185 (H) 03/16/2021    BUN 35 (H) 03/16/2021    CREATININE 1.28 (H) 03/16/2021    EGFRIFNONA 40 (L) 03/16/2021     (L) 03/16/2021    K 4.7 03/16/2021    CL 86 (L) 03/16/2021    CO2 26.0 03/16/2021    CALCIUM 9.7 03/16/2021       RADIOLOGY:  No radiology results for the last 3 days     I reviewed the patient's new clinical results.     3/8/21 Cardiac clearance received from Dr. Singh with hard copy in patient's chart.    Cancer Staging (if applicable)  Cancer Patient: __ yes _X_no __unknown; If yes, clinical stage T:__ N:__M:__, stage group or __N/A    Impression: Central disc herniation L4-L5 with spondylolisthesis and central spinal canal stenosis    Plan:   1. Lumbar laminectomy, posterior lumbar interbody fustion L4-L5  2. Anesthesia aware of elevated BP. Will continue to monitor closely.      Luann Gandhi, APRN   3/17/2021   12:03 EDT

## 2021-03-17 NOTE — ANESTHESIA PREPROCEDURE EVALUATION
Anesthesia Evaluation                  Airway   Mallampati: I  TM distance: >3 FB  Neck ROM: full  No difficulty expected  Dental      Pulmonary    Cardiovascular     ECG reviewed        Neuro/Psych  GI/Hepatic/Renal/Endo    (+)  GERD,  hepatitis, liver disease, diabetes mellitus,     Musculoskeletal     Abdominal    Substance History      OB/GYN          Other                        Anesthesia Plan    ASA 3     general     intravenous induction     Anesthetic plan, all risks, benefits, and alternatives have been provided, discussed and informed consent has been obtained with: patient.    Plan discussed with CRNA.

## 2021-03-17 NOTE — PLAN OF CARE
Goal Outcome Evaluation:  Plan of Care Reviewed With: patient  Progress: no change   Pt recd from PACU, pt is alert & oriented, no complaints of numbness or tingling, ok for pt daughter to stay with pt because pt gets confused at night

## 2021-03-18 VITALS
HEIGHT: 62 IN | RESPIRATION RATE: 16 BRPM | HEART RATE: 96 BPM | DIASTOLIC BLOOD PRESSURE: 52 MMHG | WEIGHT: 155 LBS | OXYGEN SATURATION: 98 % | SYSTOLIC BLOOD PRESSURE: 142 MMHG | TEMPERATURE: 98.2 F | BODY MASS INDEX: 28.52 KG/M2

## 2021-03-18 LAB — GLUCOSE BLDC GLUCOMTR-MCNC: 381 MG/DL (ref 70–130)

## 2021-03-18 PROCEDURE — G0108 DIAB MANAGE TRN  PER INDIV: HCPCS

## 2021-03-18 PROCEDURE — 82962 GLUCOSE BLOOD TEST: CPT

## 2021-03-18 PROCEDURE — 94799 UNLISTED PULMONARY SVC/PX: CPT

## 2021-03-18 PROCEDURE — 25010000003 CEFAZOLIN IN DEXTROSE 2-4 GM/100ML-% SOLUTION: Performed by: NEUROLOGICAL SURGERY

## 2021-03-18 PROCEDURE — 99024 POSTOP FOLLOW-UP VISIT: CPT | Performed by: PHYSICIAN ASSISTANT

## 2021-03-18 RX ORDER — OXYCODONE HYDROCHLORIDE AND ACETAMINOPHEN 5; 325 MG/1; MG/1
1 TABLET ORAL EVERY 4 HOURS PRN
Qty: 50 TABLET | Refills: 0 | Status: SHIPPED | OUTPATIENT
Start: 2021-03-18

## 2021-03-18 RX ORDER — METHOCARBAMOL 750 MG/1
750 TABLET, FILM COATED ORAL 3 TIMES DAILY
Qty: 60 TABLET | Refills: 1 | Status: SHIPPED | OUTPATIENT
Start: 2021-03-18

## 2021-03-18 RX ORDER — OXYCODONE HYDROCHLORIDE AND ACETAMINOPHEN 5; 325 MG/1; MG/1
2 TABLET ORAL EVERY 6 HOURS PRN
Qty: 40 TABLET | Refills: 0 | OUTPATIENT
Start: 2021-03-18

## 2021-03-18 RX ADMIN — SODIUM CHLORIDE, PRESERVATIVE FREE 3 ML: 5 INJECTION INTRAVENOUS at 08:48

## 2021-03-18 RX ADMIN — OXYCODONE AND ACETAMINOPHEN 1 TABLET: 5; 325 TABLET ORAL at 12:36

## 2021-03-18 RX ADMIN — PANTOPRAZOLE SODIUM 40 MG: 40 TABLET, DELAYED RELEASE ORAL at 08:47

## 2021-03-18 RX ADMIN — CEFAZOLIN SODIUM 2 G: 2 INJECTION, SOLUTION INTRAVENOUS at 06:06

## 2021-03-18 RX ADMIN — ALPRAZOLAM 0.25 MG: 0.25 TABLET ORAL at 12:36

## 2021-03-18 RX ADMIN — POLYETHYLENE GLYCOL 3350 17 G: 17 POWDER, FOR SOLUTION ORAL at 08:48

## 2021-03-18 RX ADMIN — OXYCODONE AND ACETAMINOPHEN 1 TABLET: 5; 325 TABLET ORAL at 06:06

## 2021-03-18 RX ADMIN — MULTIVITAMIN TABLET 1 TABLET: TABLET at 08:48

## 2021-03-18 NOTE — PLAN OF CARE
Goal Outcome Evaluation:     Progress: improving   VSS, on RA. Voiding well. Pain controlled with PRN pain meds. Ambulated in june. Zo=319qm output during shift.

## 2021-03-18 NOTE — PROGRESS NOTES
HOD# : 1    No events last night  Patient able to stand without excruciating pain that she was having preop.  Patient does have a little back soreness that is reasonable.    Patient is doing much better and only had 130 mils out overnight in JAVIER drain.  I pulled this out myself.    We will need to bandage change.    Patient will work with physical therapy today.  We will check back in on her at noon if she wishes to go we will put in discharge orders.    Lumbago    Spondylolisthesis at L4-L5 level      Temp:  [97.4 °F (36.3 °C)-98.2 °F (36.8 °C)] 97.7 °F (36.5 °C)  Heart Rate:  [] 88  Resp:  [16-20] 18  BP: (113-179)/(46-69) 145/52  I/O last 3 completed shifts:  In: 1000 [I.V.:1000]  Out: 1900 [Urine:1800; Drains:100]  I/O this shift:  In: -   Out: 720 [Urine:700; Drains:20]  Vital signs were reviewed and documented in the chart      EXAM   Body mass index is 28.35 kg/m².      Patient appeared in good neurologic function with normal comprehension   CN grossly intact  Moves all extremities to command  5 or 5 strength bilateral lower extremities    DIAGNOSIS  1. Lumbago          PLAN    Recheck at noon.    Redress wound.    I pulled JAVIER drain.    Patient will be fitted today with a prefabricated rigid LSO that will help her with activities when getting out of bed.  This will help with stabilization and pain control while doing activities with physical therapy.

## 2021-03-18 NOTE — DISCHARGE SUMMARY
DISCHARGE SUMMARY  PATIENT:  FLAQUITO MARTINEZ  YOB: 1940  VISIT ID:  [id number]  PRIMARY CARE:  Christian Mendoza MD  ADMITTING PHYSICIAN:  Archana acuna. providers found    DATE OF ADMISSION:  3/17/2021  DATE OF DISCHARGE: 3/18/2021      ADMITTING DIAGNOSIS:  L4-5 spondylolisthesis    DISCHARGE DIAGNOSIS:  Same    Past Medical History:   Diagnosis Date   • Arthritis    • Back problem    • Bladder infection    • Bleeding tendency (CMS/HCC)    • Bronchitis    • Diabetes mellitus (CMS/HCC)    • Direct hernia    • GERD (gastroesophageal reflux disease)    • Gout    • Hearing loss    • Heart disease     stint placement   • Hepatitis    • History of stomach ulcers    • History of transfusion     no reactions   • Hypertension          PROCEDURES:  L4-5 lumbar fusion    BRIEF HOSPITAL COURSE:  Ms. Martinez is a 80 y.o. female with a history of recalcitrant L5 radiculitis with neurogenic claudication.  Patient had a spondylolisthesis at L4-5 as such was admitted to the hospital on 3/17/2021 for L4-5 lumbar fusion.    Procedure went without event patient was admitted to the floor for observation pain control.  Pain was managed well throughout the night and patient had minimal output out of her drain and this was discontinued.    Patient was ambulate take food by mouth voiding appropriately at discharge    DISCHARGE MEDICATIONS:     Discharge Medications      New Medications      Instructions Start Date   methocarbamol 750 MG tablet  Commonly known as: ROBAXIN   750 mg, Oral, 3 Times Daily      oxyCODONE-acetaminophen 5-325 MG per tablet  Commonly known as: Percocet   1 tablet, Oral, Every 4 Hours PRN         Continue These Medications      Instructions Start Date   ALPRAZolam 0.5 MG tablet  Commonly known as: XANAX   0.5 mg, Oral, 2 Times Daily PRN      Aspirin Buf(CaCarb-MgCarb-MgO) 81 MG tablet   81 mg, Oral, Daily      Benadryl Allergy 25 mg capsule  Generic drug: diphenhydrAMINE   25 mg, Oral, Nightly PRN       furosemide 20 MG tablet  Commonly known as: LASIX   20 mg, Oral, Daily      hydroCHLOROthiazide 12.5 MG capsule  Commonly known as: MICROZIDE   12.5 mg, Oral, Daily      Lantus SoloStar 100 UNIT/ML injection pen  Generic drug: Insulin Glargine   Subcutaneous, Daily, Sliding scale      lisinopril 40 MG tablet  Commonly known as: PRINIVIL,ZESTRIL   40 mg, Oral, 2 times daily      multivitamin tablet tablet   1 tablet, Daily      omeprazole 20 MG capsule  Commonly known as: priLOSEC   20 mg, Oral, Daily      promethazine 25 MG tablet  Commonly known as: PHENERGAN   As Needed      traMADol 50 MG tablet  Commonly known as: ULTRAM   tramadol 50 mg tablet   1 q Every twelve hours         Stop These Medications    gabapentin 100 MG capsule  Commonly known as: NEURONTIN              ACTIVITY:  No heavy lifting bending or twisting    DIET:  Normal  FOLLOW UP:      Follow-up Information     Christian Mendoza MD .    Specialty: Internal Medicine  Contact information:  [address] [facility] [address] [phone]             Nirmal Umana PA-C Follow up on 3/29/2021.    Specialties: Physician Assistant, Neurosurgery  Why: wound check  Contact information:  [address] [id number] [phone]

## 2021-03-18 NOTE — CONSULTS
After obtaining permission, seen Ms. Ogden for diabetes education.  We reviewed her current A1c of 7.8 and she was congratulated on being at goal for her age.  Ms. Ogden has a working meter at home and is comfortable using.  She states her daughter helps with doctors appointment but she has always been able to manage blood sugars.  She is currently taking Lantus daily, and SSI with meals.  Ms. Ogden states her last A1c was 7.4 but she has been on steroid therapy for back issues.  We discussed the effects of steroids, pain, and stress on blood sugar. She was encouraged to continue to see PCP regularly, dentist, and obtain yearly dilated eye exams.  We reviewed treatment for hypoglycemia, Ms. Ogden denies any recent episodes.  We reviewed feet exams daily and checking blood sugars 3-4 times per day.  Ms. Ogden was offered a complimentary outpatient class, but denies at this time. She was given handouts on A1c and glucose goals per ADA.  She was given my card for future questions. Thank you.

## 2021-03-18 NOTE — PROGRESS NOTES
Discharge Planning Assessment  [facility]     Patient Name: Margoth Ogden  MRN: [id number]  Today's Date: 3/18/2021    Admit Date: 3/17/2021    Discharge Needs Assessment     Row Name 03/18/21 0950       Living Environment    Lives With  spouse    Current Living Arrangements  home/apartment/condo    Primary Care Provided by  self    Provides Primary Care For  no one    Family Caregiver if Needed  spouse    Quality of Family Relationships  involved;supportive    Able to Return to Prior Arrangements  yes       Resource/Environmental Concerns    Resource/Environmental Concerns  none       Transition Planning    Patient/Family Anticipates Transition to  home with family    Patient/Family Anticipated Services at Transition  ;rehabilitation services    Transportation Anticipated  family or friend will provide       Discharge Needs Assessment    Readmission Within the Last 30 Days  no previous admission in last 30 days    Equipment Currently Used at Home  walker, rolling    Concerns to be Addressed  discharge planning    Anticipated Changes Related to Illness  none    Equipment Needed After Discharge  none        Discharge Plan     Row Name 03/18/21 0951       Plan    Plan  Home    Patient/Family in Agreement with Plan  yes    Plan Comments  Spoke with patient in room to initiate discharge planning.  She lives with her  in [facility].  She is independent with ADL's, using a rolling walker to assist with ambulation.  She has no other DME at home and is not current with home health.  Ms. Ogden has RX coverage and has her scripts filled at [facility].  Her goal is to return home at discharge.  Will await therapy recommendations to determine proper discharge placement.  CM will continue to follow.  Rae Berg RN [phone]    Final Discharge Disposition Code  01 - home or self-care        Continued Care and Services - Admitted Since 3/17/2021    Coordination has not been started for this encounter.        Expected Discharge Date and Time     Expected Discharge Date Expected Discharge Time    Mar 21, 2021         Demographic Summary     Row Name 03/18/21 0949       General Information    Admission Type  inpatient    Arrived From  PACU/recovery room    Referral Source  admission list    Reason for Consult  discharge planning    Preferred Language  English     Used During This Interaction  no    General Information Comments  PCP- Christian Mendoza        Functional Status     Row Name 03/18/21 0950       Functional Status    Usual Activity Tolerance  moderate    Current Activity Tolerance  moderate       Functional Status, IADL    Medications  independent    Meal Preparation  assistive equipment    Housekeeping  assistive equipment    Laundry  assistive equipment    Shopping  assistive equipment        Psychosocial    No documentation.       Abuse/Neglect    No documentation.       Legal     Row Name 03/18/21 0950       Financial/Legal    Finance Comments  Verified with patient that she has [employer] M[employer].  No issues obtaining medications.        Substance Abuse    No documentation.       Patient Forms    No documentation.           Natalia Berg RN

## 2021-03-19 ENCOUNTER — TELEPHONE (OUTPATIENT)
Dept: NEUROSURGERY | Facility: CLINIC | Age: 81
End: 2021-03-19

## 2021-03-19 DIAGNOSIS — M43.16 SPONDYLOLISTHESIS AT L4-L5 LEVEL: Primary | ICD-10-CM

## 2021-03-19 RX ORDER — TAMSULOSIN HYDROCHLORIDE 0.4 MG/1
0.4 CAPSULE ORAL DAILY
Status: SHIPPED | OUTPATIENT
Start: 2021-03-19

## 2021-03-19 RX ORDER — TAMSULOSIN HYDROCHLORIDE 0.4 MG/1
0.4 CAPSULE ORAL DAILY
Status: DISCONTINUED | OUTPATIENT
Start: 2021-03-19 | End: 2021-03-19

## 2021-03-19 NOTE — TELEPHONE ENCOUNTER
I spoken at length with patient and daughter.  Phlegm after intubation is normal.    I spent about 35 minutes on the phone with them creating a care plan for her.  She is very anxious.    She is going to take some magnesium citrate to promote a bowel movement.  I will also put her on a little bit of Flomax due to some urinary hesitancy.

## 2021-03-19 NOTE — TELEPHONE ENCOUNTER
"Provider:  Gil  Caller: patient  Time of call:   1:30  Phone #:  [phone]  Surgery:  Lumbar laminectomy  Surgery Date:  03/17/21  Last visit:   same  Next visit: 03/29/21    RENATE:         Reason for call:     Patient called and said she spoke with someone earlier but forgot to ask about \"the flem\" that she can't get up.         "

## 2021-03-22 ENCOUNTER — TELEPHONE (OUTPATIENT)
Dept: NEUROSURGERY | Facility: CLINIC | Age: 81
End: 2021-03-22

## 2021-03-22 ENCOUNTER — APPOINTMENT (OUTPATIENT)
Dept: PREADMISSION TESTING | Facility: HOSPITAL | Age: 81
End: 2021-03-22

## 2021-03-22 NOTE — TELEPHONE ENCOUNTER
Provider:  Gil  Caller: pt  Time of call:   2:26  Phone #:  [phone]  Surgery: lumbar laminectomy   Surgery Date:  3/17/21  Last visit: 2-1-2021    Next visit: 3-29-21    RENATE:         Reason for call:     Patient called in having questions about her surgery she had on Thursday.Her questions were how should she get in and out of bed if she can't twist or bend. Also she is asking about bowell and bladder issues. She stated that she is urinating ok but it isn't as forceful coming out, her bowels moved good before surgery but has not moved since, but she also stated that she hasn't ate a whole lot. She has been taking stool softener's.  Please  Advise.thank you

## 2021-03-24 ENCOUNTER — TELEPHONE (OUTPATIENT)
Dept: NEUROSURGERY | Facility: CLINIC | Age: 81
End: 2021-03-24

## 2021-03-24 NOTE — TELEPHONE ENCOUNTER
Provider:  Gil  Caller: patient  Time of call:  11:30   Phone #:  [phone]  Surgery:  Lumbar laminectomy  Surgery Date:  03/17/21  Last visit:   same  Next visit: 03/29/21    RENATE:         Reason for call:     Patient called and said her PCP wanted our permission to see her for a possible UTI?    She wants to know if it's normal to have to physically get up and turn herself in the bed every 2 hours?  She is not able to lay on one side for more than 2 hours.    She wants to know if it's okay to lay on her back?    She said she may  the muscle relaxer and just take it at bedtime to see if that helps her relax.

## 2021-03-24 NOTE — TELEPHONE ENCOUNTER
Yes patient can see her PCP for UTI.  She is able to lay on her back as long as she is comfortable.

## 2021-03-29 ENCOUNTER — TELEPHONE (OUTPATIENT)
Dept: NEUROSURGERY | Facility: CLINIC | Age: 81
End: 2021-03-29

## 2021-03-29 ENCOUNTER — OFFICE VISIT (OUTPATIENT)
Dept: NEUROSURGERY | Facility: CLINIC | Age: 81
End: 2021-03-29

## 2021-03-29 VITALS
DIASTOLIC BLOOD PRESSURE: 65 MMHG | HEART RATE: 83 BPM | HEIGHT: 62 IN | TEMPERATURE: 97.7 F | SYSTOLIC BLOOD PRESSURE: 150 MMHG | WEIGHT: 153 LBS | OXYGEN SATURATION: 98 % | BODY MASS INDEX: 28.16 KG/M2

## 2021-03-29 DIAGNOSIS — M43.16 SPONDYLOLISTHESIS AT L4-L5 LEVEL: ICD-10-CM

## 2021-03-29 DIAGNOSIS — Z98.1 S/P LUMBAR FUSION: Primary | ICD-10-CM

## 2021-03-29 PROCEDURE — 99024 POSTOP FOLLOW-UP VISIT: CPT | Performed by: PHYSICIAN ASSISTANT

## 2021-03-29 RX ORDER — PEN NEEDLE, DIABETIC 31 GX3/16"
NEEDLE, DISPOSABLE MISCELLANEOUS AS NEEDED
COMMUNITY
Start: 2021-02-03

## 2021-03-29 RX ORDER — ROSUVASTATIN CALCIUM 40 MG/1
TABLET, COATED ORAL DAILY
COMMUNITY
Start: 2021-03-05

## 2021-03-29 RX ORDER — INSULIN ASPART 100 [IU]/ML
INJECTION, SUSPENSION SUBCUTANEOUS AS NEEDED
COMMUNITY
Start: 2021-02-04

## 2021-03-29 RX ORDER — ALLOPURINOL 100 MG/1
TABLET ORAL DAILY
COMMUNITY
Start: 2021-02-02

## 2021-03-29 NOTE — PATIENT INSTRUCTIONS

## 2021-03-29 NOTE — PROGRESS NOTES
Subjective   Patient ID: Margoth Ogden is a 80 y.o. female is here today for follow-up for wound check.    HPI:      Patient is a nice 80-year-old woman who is well-known to the neurosurgical practice for having a massive L4-5 disc herniation with spondylolisthesis L4-5.  Patient was deemed surgical candidate by Dr. Ethan Cordero and underwent a L4-5 lumbar fusion on 3/17/2021.    Patient did exceedingly well in the postoperative course his only complaint this time is minimal back pain and trouble with urination.  Patient is able to stand up and walk for longer distance and now her right knee is the limiting factor.  She has a fairly pronounced valgus deformity on the right knee.    Incision is checked today and shows a tape allergy but incision is well-healed well approximated no sign infection bleed erythema.     Patient is accompanied by her .  Numerous questions were reviewed and discussed and patient had no initial questions at the time of visit    The following portions of the patient's history were reviewed and updated as appropriate: allergies, current medications, past family history, past medical history, past social history, past surgical history and problem list.    Review of Systems   Constitutional: Positive for appetite change. Negative for activity change, chills, diaphoresis, fatigue, fever and unexpected weight change.   HENT: Positive for drooling and hearing loss. Negative for congestion, dental problem, ear discharge, ear pain, facial swelling, mouth sores, nosebleeds, postnasal drip, rhinorrhea, sinus pressure, sinus pain, sneezing, sore throat, tinnitus, trouble swallowing and voice change.    Eyes: Negative for photophobia, pain, discharge, redness, itching and visual disturbance.   Respiratory: Negative for apnea, cough, choking, chest tightness, shortness of breath, wheezing and stridor.    Cardiovascular: Positive for leg swelling. Negative for chest pain and palpitations.  "  Gastrointestinal: Negative for abdominal distention, abdominal pain, anal bleeding, blood in stool, constipation, diarrhea, nausea, rectal pain and vomiting.   Endocrine: Positive for cold intolerance. Negative for heat intolerance, polydipsia, polyphagia and polyuria.   Genitourinary: Positive for decreased urine volume and difficulty urinating. Negative for dyspareunia, dysuria, enuresis, flank pain, frequency, genital sores, hematuria, menstrual problem, pelvic pain, urgency, vaginal bleeding, vaginal discharge and vaginal pain.   Musculoskeletal: Negative for arthralgias, back pain, gait problem, joint swelling, myalgias, neck pain and neck stiffness.   Skin: Negative for color change, pallor, rash and wound.   Allergic/Immunologic: Negative for environmental allergies, food allergies and immunocompromised state.   Neurological: Negative for dizziness, tremors, seizures, syncope, facial asymmetry, speech difficulty, weakness, light-headedness, numbness and headaches.   Hematological: Negative for adenopathy. Does not bruise/bleed easily.   Psychiatric/Behavioral: Negative for agitation, behavioral problems, confusion, decreased concentration, dysphoric mood, hallucinations, self-injury, sleep disturbance and suicidal ideas. The patient is not nervous/anxious and is not hyperactive.          Objective    reports that she has never smoked. She has never used smokeless tobacco. She reports that she does not drink alcohol and does not use drugs.   SMOKING STATUS: Non-smoker    Physical Exam:   Vitals:/65 (BP Location: Left arm, Patient Position: Sitting, Cuff Size: Adult)   Pulse 83   Temp 97.7 °F (36.5 °C)   Ht 157.5 cm (62.01\")   Wt 69.4 kg (153 lb)   SpO2 98%   BMI 27.98 kg/m²    BMI: Body mass index is 27.98 kg/m².       Incision:   The incision is well-healed and well approximated.  No signs of infection, bleeding, or erythema.    Musculoskeletal:                                            " Dorsiflexion is 5/5 Bilaterally                    Plantarflexion is 5/5 bilaterally                    Hip Flexion 5/5 bilaterally.       Mild swelling right foot secondary to right knee inflammation                 Neurologic:                                         The patient is alert and oriented by 3.                       Sensation is equal bilaterally with no deficit.                        Reflexes:  2+ throughout       Assessment/Plan   Independent Review of Radiographic Studies:    No new films reviewed at this visit  Medical Decision Making:    Patient is doing very well at this point.  Patient is pleased postsurgical outcome.  Incision is clean, dry.  No signs of infection, bleeding, or erythema.    The patient will follow-up in 6 weeks with AP and lateral x-ray of the Lumbar Spine, after completing physical therapy. The patient understands instructions and limitations for the best post-operative success.    It has been a pleasure providing neurosurgical care.    Nirmal Umana PA-C      Patient's Body mass index is 27.98 kg/m². BMI is above normal parameters. Recommendations include: educational material.    Diagnoses and all orders for this visit:    1. S/P lumbar fusion (Primary)  -     XR Spine Lumbar AP & Lateral; Future  -     Ambulatory Referral to Physical Therapy Evaluate and treat, POST OP; Stretching, ROM, Strengthening    2. Spondylolisthesis at L4-L5 level      Return in about 6 weeks (around 5/10/2021).

## 2021-03-29 NOTE — TELEPHONE ENCOUNTER
When up for more than 5 minutes she should have the brace on.  She does not need it in bed.  She does not need it in recliner.  She should wear it until we see her in 6 weeks

## 2021-03-30 ENCOUNTER — TELEPHONE (OUTPATIENT)
Dept: NEUROSURGERY | Facility: CLINIC | Age: 81
End: 2021-03-30

## 2021-03-30 NOTE — TELEPHONE ENCOUNTER
Patient called and wanted to know if we could back date her PA for Percocet so her DIL could get paid back for for monies she had to pay out of pocket.  (The pharmacist states the insurance denied payment and patient's DIL did pay out of pocket.)    Patient notified I cannot back-date a prior auth and was thankful for the call.        Patient wants to know if it's normal for her back to feel so strange.  Is this a normal sensation.  She states her back feels really heavy. She wants to know what is back there.  Is there instrumentation?

## 2021-03-31 NOTE — TELEPHONE ENCOUNTER
"We cannot backdate prescriptions.    We \"reoriented her spine with shereen and screws and cages.  At next visit we will show her the pictures of the instrumentation show she will be able to fully understand what we have done.  "

## 2021-04-13 ENCOUNTER — TELEPHONE (OUTPATIENT)
Dept: NEUROSURGERY | Facility: CLINIC | Age: 81
End: 2021-04-13

## 2021-04-14 ENCOUNTER — TELEPHONE (OUTPATIENT)
Dept: NEUROSURGERY | Facility: CLINIC | Age: 81
End: 2021-04-14

## 2021-04-14 NOTE — TELEPHONE ENCOUNTER
Provider: Gil  Caller: patient  Time of call:   2:08  Phone #:  [phone]  Surgery:  Lumbar Laminectomy  Surgery Date:  3-17-21  Last visit:   3-29-21  Next visit:5-10-21     RENATE:         Reason for call:     Patient called and said she had some questions about her medication. I called patient  back and she wanted to know if she could take Turmeric for her arthritis and her swelling. She went on to say that  Dr. Cordero said she could take the Ibuprofen, but she doesn't believe he really wants her to take it. Please Advise. Thank you.

## 2021-04-14 NOTE — TELEPHONE ENCOUNTER
I discussed this with Dr. Cordero twice.  Patient is okay to take ibuprofen she is not okay to start steroids.  Patient is okay to take turmeric if she would like.

## 2021-05-06 ENCOUNTER — TELEPHONE (OUTPATIENT)
Dept: NEUROSURGERY | Facility: CLINIC | Age: 81
End: 2021-05-06

## 2021-05-06 NOTE — TELEPHONE ENCOUNTER
Provider: Dong  Caller: Patient  Time of call: 12:34    Phone #:  [phone]  Surgery:  LUMBAR LAMINECTOMY POSTERIOR LUMBAR INTERBODY FUSION L4-5  Surgery Date:  03/17/2021  Last visit:  03/29/2021  Next visit: 05/10/2021       Reason for call:  Patient LVM to confirm time and place for her appointment on 5/10    I called patient back and gave her the needed information for her x-ray location and post-op appointment time/location. Patient verbalized understanding and was thankful for the call back.

## 2021-05-10 ENCOUNTER — TELEPHONE (OUTPATIENT)
Dept: NEUROSURGERY | Facility: CLINIC | Age: 81
End: 2021-05-10

## 2021-05-10 ENCOUNTER — HOSPITAL ENCOUNTER (OUTPATIENT)
Dept: GENERAL RADIOLOGY | Facility: HOSPITAL | Age: 81
Discharge: HOME | End: 2021-05-10
Admitting: PHYSICIAN ASSISTANT

## 2021-05-10 ENCOUNTER — OFFICE VISIT (OUTPATIENT)
Dept: NEUROSURGERY | Facility: CLINIC | Age: 81
End: 2021-05-10

## 2021-05-10 VITALS
TEMPERATURE: 97.6 F | BODY MASS INDEX: 28.71 KG/M2 | SYSTOLIC BLOOD PRESSURE: 180 MMHG | WEIGHT: 156 LBS | HEIGHT: 62 IN | DIASTOLIC BLOOD PRESSURE: 78 MMHG

## 2021-05-10 DIAGNOSIS — Z98.1 S/P LUMBAR FUSION: ICD-10-CM

## 2021-05-10 DIAGNOSIS — M43.16 SPONDYLOLISTHESIS AT L4-L5 LEVEL: Primary | ICD-10-CM

## 2021-05-10 DIAGNOSIS — M54.40 ACUTE RIGHT-SIDED LOW BACK PAIN WITH SCIATICA, SCIATICA LATERALITY UNSPECIFIED: ICD-10-CM

## 2021-05-10 PROCEDURE — 72100 X-RAY EXAM L-S SPINE 2/3 VWS: CPT

## 2021-05-10 PROCEDURE — 99024 POSTOP FOLLOW-UP VISIT: CPT | Performed by: PHYSICIAN ASSISTANT

## 2021-05-10 NOTE — TELEPHONE ENCOUNTER
Provider:  Dong  Caller: Patient  Time of call:   2:19  Phone #: [phone]   Surgery:  Lumbar laminectomy posterior lumbar interbody fusion L4-5   Surgery Date: 03/17/2021  Last visit:   5/10/2021  Next visit: NA     Reason for call:  Patient wants to know if she is allowed to  anything more than 5lbs?

## 2021-05-10 NOTE — PROGRESS NOTES
Subjective   Patient ID: Margoth Ogden is a 80 y.o. female is here today for follow-up for x-ray follow-up.    HPI:        Patient is a nice 80-year-old woman who is well-known to the neurosurgical practice for having a massive L4-5 disc herniation with spondylolisthesis L4-5.  Patient was deemed surgical candidate by Dr. Ethan Cordero and underwent a L4-5 lumbar fusion on 3/17/2021.    Patient has done very well with the surgery and has had no nerve type pain.  Patient has a valgus deformity of the right knee that likely requires attention.    Patient's incision is clean dry no sign infection bleed erythema.  Patient is weaning herself out of the lumbar brace which I think is reasonable.    Patient gets tired in her low back after a long day but really has very little in the way of back pain.    The following portions of the patient's history were reviewed and updated as appropriate: allergies, current medications, past family history, past medical history, past social history, past surgical history and problem list.    Review of Systems   Constitutional: Negative for activity change, appetite change, chills, fatigue and fever.   HENT: Negative for congestion, dental problem, ear pain, hearing loss, sinus pressure and tinnitus.    Eyes: Negative for pain and redness.   Respiratory: Negative for apnea, cough, shortness of breath and wheezing.    Cardiovascular: Negative for chest pain, palpitations and leg swelling.   Gastrointestinal: Negative for abdominal distention, abdominal pain, blood in stool, constipation, diarrhea, nausea and vomiting.   Endocrine: Negative for cold intolerance, heat intolerance and polyuria.   Genitourinary: Negative for enuresis, frequency and urgency.   Musculoskeletal: Positive for back pain.   Skin: Negative for color change and rash.   Neurological: Negative for dizziness, tremors, seizures, syncope, speech difficulty, weakness, light-headedness, numbness and headaches.  "  Psychiatric/Behavioral: Positive for agitation. Negative for behavioral problems and confusion. The patient is not nervous/anxious.    All other systems reviewed and are negative.        Objective    reports that she has never smoked. She has never used smokeless tobacco. She reports that she does not drink alcohol and does not use drugs.   SMOKING STATUS: Non-smoker    Physical Exam:   Vitals:/78   Temp 97.6 °F (36.4 °C)   Ht 157.5 cm (62.01\")   Wt 70.8 kg (156 lb)   BMI 28.52 kg/m²    BMI: Body mass index is 28.52 kg/m².         Incision:   The incision is well-healed and well approximated.  No signs of infection, bleeding, or erythema.    Musculoskeletal:                                            Dorsiflexion is 5/5 Bilaterally                    Plantarflexion is 5/5 bilaterally                    Hip Flexion 5/5 bilaterally.                        Neurologic:                                         The patient is alert and oriented by 3.                       Sensation is equal bilaterally with no deficit.                        Reflexes:  2+ throughout       Assessment/Plan   Independent Review of Radiographic Studies:    AP and lateral x-rays lumbar spine reviewed showed good placement of hardware and screws at L4-5 level  Medical Decision Making:    At this point the patient is doing very well.  The patient is pleased with postsurgical outcome.  With the resolution of pain, I believe that it is adequate to see the patient back on an as-needed basis.  The patient has been educated on reasons that would necessitate a referral back to us in a more urgent manner.  The patient understands of his instructions and limitations for the best post-operative success.    It has been a pleasure providing neurosurgical care.    Nirmal Umana PA-C    Patient's Body mass index is 28.52 kg/m². indicating that she is overweight (BMI 25-29.9). Obesity-related health conditions include the following: none. Obesity is " unchanged. BMI is is above average; BMI management plan is completed. We discussed increasing exercise.    Diagnoses and all orders for this visit:    1. Spondylolisthesis at L4-L5 level (Primary)    2. Acute right-sided low back pain with sciatica, sciatica laterality unspecified      No follow-ups on file.

## 2021-06-17 ENCOUNTER — TELEPHONE (OUTPATIENT)
Dept: NEUROSURGERY | Facility: CLINIC | Age: 81
End: 2021-06-17

## 2021-06-17 NOTE — TELEPHONE ENCOUNTER
It is normal to have some discomfort. As long as her preoperative symptoms have not recurred that is great. She may start advancing her activity. If she would like to do some PT happy to order this for her.

## 2021-06-17 NOTE — TELEPHONE ENCOUNTER
"Provider:  Gil  Caller: Patient  Time of call:   11:17am  Phone #:  [phone]  Surgery:  LUMBAR LAMINECTOMY POSTERIOR LUMBAR INTERBODY FUSION L4-5  Surgery Date:  03/17/2021  Last visit:   05/10/2021  Next visit: KENIA DEWITT:         Reason for call:    Patient called LVM on the clinical line stating she had surgery 3 months ago with Dr. Cordero, and she had a few questions.     I called and talked to the patient. Patient \"I just wanted to talk to some, and figured ill call with some questions\"    Patient stated she was doing well during her last appointment, and she is thinks she is still doing well. Patient stated she is still sore/achy where the incision is at, and she would like to know if this is still normal to be feeling after 3 months. Incision is not red, but is a little sore and tender to the touch. Patient is also wanting to know if she can go back to the bending, lifting, and twisting. Patient would like to know what degree of bending is ok.     Also wanting to know if PT would be beneficial to her.            "

## 2021-06-17 NOTE — TELEPHONE ENCOUNTER
I called and advised the patient. Patient verbalized understanding, and stated she would be willing to do, but wanted to call her PCP to get the order.

## 2021-08-05 ENCOUNTER — TELEPHONE (OUTPATIENT)
Dept: NEUROSURGERY | Facility: CLINIC | Age: 81
End: 2021-08-05

## 2021-08-05 DIAGNOSIS — M43.16 SPONDYLOLISTHESIS AT L4-L5 LEVEL: ICD-10-CM

## 2021-08-05 DIAGNOSIS — Z98.1 S/P LUMBAR FUSION: Primary | ICD-10-CM

## 2021-08-05 NOTE — TELEPHONE ENCOUNTER
"Provider:  Gil  Caller: Patient  Time of call:   10:29am  Phone #:  [phone]  Surgery:  LUMBAR LAMINECTOMY POSTERIOR LUMBAR INTERBODY FUSION L4-5  Surgery Date:  03/17/2021  Last visit:  05/10/2021  Next visit: N/A    RENATE:         Reason for call:    Patient called and LVM stating she had questions and requested a call back. I called and talked to the patient.   Patient stated during her last appointment with Nirmal, there was talk about her completing PT, but nothing was ever ordered.     Patient is wanting to know if she should complete PT.  Patient stated after a long day, her lower back around her incision is tender, and when laying down, her incision is tender as well.  Patient stated other than that she is doing ok.    Patient stated, \"When I am laying down, I can just tell something is in there, and different.\"    Patient requesting a call back from Nirmal.         "

## 2021-08-05 NOTE — TELEPHONE ENCOUNTER
Yes, as Nirmal said having some incisional/back pain postoperatively is absolutely reasonable at this time.  If she continues to have difficulty, obtain can see patient for further work-up and probable pain management referral.

## 2021-08-05 NOTE — TELEPHONE ENCOUNTER
Patient have a PT order.  A little bit of postoperative back pain is absolutely reasonable even at this point.  As all she is not having the searing leg pain that she had prior to surgery there is little to do or offer.  If she needs further work-up for back pain we can call her for further work-up and probable pain management.

## 2021-08-05 NOTE — TELEPHONE ENCOUNTER
Patient said she didn't ask for pain management or PT, that we have misunderstood.  She said she is not having any pain.    Her question, patient states is when she lays down on her incisional side at night that area feels tender.  She wants to know if that is normal?

## 2021-08-05 NOTE — TELEPHONE ENCOUNTER
Patient wanted to thank Nirmal QUEZADA for all that he's done. (she stated she sure wish he wasn't so busy as she loves to speak with him.)

## 2021-11-19 ENCOUNTER — TELEPHONE (OUTPATIENT)
Dept: NEUROSURGERY | Facility: CLINIC | Age: 81
End: 2021-11-19

## 2021-11-19 NOTE — TELEPHONE ENCOUNTER
Provider:  Gil/Fany  Caller: Patient  Time of call:   11:20  Phone #:  [phone]  Surgery:  LUMBAR LAMINECTOMY POSTERIOR LUMBAR INTERBODY FUSION L4-5  Surgery Date: 03/17/2021    Last visit: Office Visit with Nirmal Umana PA-C (05/10/2021)    Next visit: NA    Reason for call:         Patient LVM stating that she had back surgery in March of this year and she had a couple questions to ask.       I called the patient to gather the questions.     Patient states that before her surgery she was experiencing involuntary movements in her toes. She reports this is what started all of her back issues.     Patient reports that she is starting to experience involuntary movements in both of her lower extremities that start at her feet and radiate to her calves. Patient wants to know if this is related to her back? The involuntary movements are intermittent.     Patient also reports that her incision area is  and sore.     I let patient know that Nirmal will be back in the office on Monday and that Dr. Cordero is out this weekend. I let her know that I will route the message to the other PA's in the office and we will get back with her either later today or Monday.     Patient said she unavailable today between 3:20 and 4:30. She was thankful for the call back.

## 2021-11-19 NOTE — TELEPHONE ENCOUNTER
Pt called back.     Called pt and relayed information.  Pt states she was concerned because this is the same symptoms she started with prior to having her spinal fusion.  Pt also states she has been on several rounds of antibiotics over the past several weeks for dental work which caused diarrhea and suspected c-diff, which pt does not think she had and resolved on it's own after about 1 week of d/c antibiotics.  Pt then ended up with a severe bladder infection and vaginitis and is scheduled to see a GYN soon to see if there is anything else going on.  Pt admits she has had a lot going on recently.  She denies any other associated symptoms and states she only really notices her involuntary toe movements when she is sitting quietly.  She denies any new or worsening back pain, bowel or bladder dysfunction. She admits to tenderness at the incision site, but thinks it may be due to her clothing always pressed against the incision and is not concerned about the superficial tenderness. She will follow up with her PCP and notify our office if any new or worsening symptoms develop.  Pt was thankful for the call.

## 2024-10-11 ENCOUNTER — TELEPHONE (OUTPATIENT)
Dept: NEUROSURGERY | Facility: CLINIC | Age: 84
End: 2024-10-11
Payer: MEDICARE

## 2024-10-11 NOTE — TELEPHONE ENCOUNTER
PROVIDER: DR. MORALES     REASON FOR CALL:    PATIENT FLAQUITO ROBERTSON CALLED ASKING FOR A CALL BACK - SHE NEEDS TO KNOW WHAT KIND OF MATERIAL WAS PUT HER IN HER BACK BY DR. MORALES IN HER SX FORM MARCH OF '21. THEY WILL NOT PERFORM AN MRI THAT SHE IS NEEDING UNTIL THEY CAN VERIFY THAT. CAN WE PLEASE CALL THE PATIENT BACK AND ANSWER HER QUESTIONS? THANKS.    CALL BACK # [phone]    CALL BACK ANYTIME AFTER 10 am

## 2024-10-11 NOTE — TELEPHONE ENCOUNTER
The proper implant records are not scanned to patient chart. 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

## 2024-10-16 NOTE — TELEPHONE ENCOUNTER
Spoke to [facility] and was told her screws are Solera screws and are MRI compatible. The cage placed is a Peek interbody cage, and it is made of plastic so is also MRI compatible. Relayed this information to Ms. Ogden.

## (undated) DEVICE — NDL HYPO ECLPS SFTY 25G 1 1/2IN

## (undated) DEVICE — ADHS LIQ MASTISOL 2/3ML

## (undated) DEVICE — PATIENT RETURN ELECTRODE, SINGLE-USE, CONTACT QUALITY MONITORING, ADULT, WITH 9FT CORD, FOR PATIENTS WEIGING OVER 33LBS. (15KG): Brand: MEGADYNE

## (undated) DEVICE — 3M™ STERI-STRIP™ REINFORCED ADHESIVE SKIN CLOSURES, R1547, 1/2 IN X 4 IN (12 MM X 100 MM), 6 STRIPS/ENVELOPE: Brand: 3M™ STERI-STRIP™

## (undated) DEVICE — ANTIBACTERIAL UNDYED BRAIDED (POLYGLACTIN 910), SYNTHETIC ABSORBABLE SUTURE: Brand: COATED VICRYL

## (undated) DEVICE — DISPOSABLE BIPOLAR FORCEPS 7 3/4" (19.7CM) SCOVILLE BAYONET, INSULATED, 1.5MM TIP AND 12 FT. (3.6M) CABLE: Brand: KIRWAN

## (undated) DEVICE — STRAP POSTN KN/BDY FM 5X72IN DISP

## (undated) DEVICE — TOOL 14MH30 LEGEND 14CM 3MM: Brand: MIDAS REX ™

## (undated) DEVICE — NEEDLE, QUINCKE 22GX3.5": Brand: MEDLINE INDUSTRIES, INC.

## (undated) DEVICE — SPHR MARKR STEALTH STATION

## (undated) DEVICE — 3M™ MEDIPORE™ H SOFT CLOTH SURGICAL TAPE, 2863, 3 IN X 10 YD, 12/CASE: Brand: 3M™ MEDIPORE™

## (undated) DEVICE — NEURO SPONGES: Brand: DEROYAL

## (undated) DEVICE — 3M™ STERI-DRAPE™ INSTRUMENT POUCH 1018: Brand: STERI-DRAPE™

## (undated) DEVICE — PILLW HDRST INTUB GENTLETOUCH 7IN RT

## (undated) DEVICE — C-ARM: Brand: UNBRANDED

## (undated) DEVICE — SUT ETHLN 3/0 FS1 30IN 669H

## (undated) DEVICE — INTENDED USE FOR SURGICAL MARKING ON INTACT SKIN, ALSO PROVIDES A PERMANENT METHOD OF IDENTIFYING OBJECTS IN THE OPERATING ROOM: Brand: WRITESITE® REGULAR TIP SKIN MARKER

## (undated) DEVICE — SUT VIC 0 UR6 27IN VCP603H

## (undated) DEVICE — SYR LL 3CC

## (undated) DEVICE — JACKSON-PRATT 100CC BULB RESERVOIR: Brand: CARDINAL HEALTH

## (undated) DEVICE — PENCL ROCKRSWCH MEGADYNE W/HOLSTR 10FT SS

## (undated) DEVICE — GLV SURG PREMIERPRO MIC LTX PF SZ8 BRN

## (undated) DEVICE — ELECTRD BLD EDGE/INSUL1P SFTY SLV 2.75IN

## (undated) DEVICE — Device

## (undated) DEVICE — DRSNG WND BORDR/ADHS NONADHR/GZ LF 4X4IN STRL

## (undated) DEVICE — SHEET,DRAPE,40X58,STERILE: Brand: MEDLINE

## (undated) DEVICE — APPL CHLORAPREP TINTED 26ML TEAL

## (undated) DEVICE — GLV SURG BIOGEL LTX PF 8

## (undated) DEVICE — SYR CONTRL LUERLOK 10CC

## (undated) DEVICE — TRAP,MUCUS SPECIMEN,40CC: Brand: MEDLINE

## (undated) DEVICE — PACK,UNIVERSAL,NO GOWNS: Brand: MEDLINE

## (undated) DEVICE — JP PERF DRN SIL FLT 7MM FULL: Brand: CARDINAL HEALTH

## (undated) DEVICE — PK NEURO DISC 10

## (undated) DEVICE — TOOL 14BA60 LEGEND 14CM 6MM: Brand: MIDAS REX ™